# Patient Record
Sex: FEMALE | Race: BLACK OR AFRICAN AMERICAN | NOT HISPANIC OR LATINO | Employment: STUDENT | URBAN - METROPOLITAN AREA
[De-identification: names, ages, dates, MRNs, and addresses within clinical notes are randomized per-mention and may not be internally consistent; named-entity substitution may affect disease eponyms.]

---

## 2025-02-27 PROBLEM — F32.2 MDD (MAJOR DEPRESSIVE DISORDER), SEVERE (MULTI): Status: ACTIVE | Noted: 2022-06-15

## 2025-02-27 PROBLEM — F32.2 MDD (MAJOR DEPRESSIVE DISORDER), SEVERE (MULTI): Status: RESOLVED | Noted: 2022-06-15 | Resolved: 2025-02-27

## 2025-02-27 PROBLEM — M41.9 SCOLIOSIS: Status: ACTIVE | Noted: 2022-06-14

## 2025-02-27 PROBLEM — M41.9 SCOLIOSIS: Status: RESOLVED | Noted: 2022-06-14 | Resolved: 2025-02-27

## 2025-02-28 NOTE — PROGRESS NOTES
Subjective   Laura Alexander is a 19 y.o.  at 29w5d with a working estimated date of delivery of 5/15/2025, by Ultrasound who presents for a initial prenatal visit with a Group New Ob. Patient had a new OB at [facility] and so is a transfer, though she was just getting irregular care there.     1:1 Visit:   Patient doing well overall. In middle of GCT.   Bleeding or cramping since LMP: no  FM: yes     Ultrasound completed this pregnancy: Yes - at [facility]    Taking prenatal vitamin: Yes    Last pap: never had one, amenable with one today     Postpartum Depression: Medium Risk (3/4/2025)    [place]  Depression Scale     Last EPDS Total Score: 7     Last EPDS Self Harm Result: Never        IPV screening:  Have you ever experienced any form of emotional, physical, sexual or financial abuse? no  Have you ever been hit, pushed, bitten, kicked, choked or grabbed by your partner or an ex-partner? no  Do you ever feel scared or threatened by your partner or ex-partner? no    MARIA EUGENIA screening:  Did any of your Parents have problems with alcohol or drug use? No  Do any of your friends (Peers) have problems with alcohol or drug use? No  Does your Partner have a problem with alcohol or drug use? No  Before you were pregnant did you have problems with alcohol or drug use? (Past)  Yes  In the past month, did you drink beer, wine or liquor, or use other drugs? (Pregnancy) No    OB History    Para Term  AB Living   2       1     SAB IAB Ectopic Multiple Live Births     1            # Outcome Date GA Lbr Vahid/2nd Weight Sex Type Anes PTL Lv   2 Current            1 IAB 05/15/22     1st Tri Surg        Past Medical History:   Diagnosis Date    Asthma     hospitalized as a child, nothing as an adult    Hx of chlamydia infection     MDD (major depressive disorder), severe (Multi) 06/15/2022    Poisoning by 4-aminophenol derivatives, intentional self-harm, initial encounter (Multi) 2020    Intentional  acetaminophen poisoning      Past Surgical History:   Procedure Laterality Date    D&C FIRST TRIMESTER / TX INCOMPLETE / MISSED / SEPTIC / INDUCED         Social History     Tobacco Use    Smoking status: Never    Smokeless tobacco: Never   Vaping Use    Vaping status: Never Used   Substance Use Topics    Alcohol use: Not Currently    Drug use: Not Currently     Types: Marijuana        Objective   Physical Exam  Weight: 53.5 kg (118 lb)  Pregravid BMI: 17.58  BP: 115/76    Physical Exam  Constitutional:       Appearance: Normal appearance.   Cardiovascular:      Rate and Rhythm: Normal rate.   Pulmonary:      Effort: Pulmonary effort is normal.   Genitourinary:     General: Normal vulva.      Vagina: Normal.      Cervix: Normal.   Skin:     General: Skin is warm and dry.   Neurological:      Mental Status: She is alert.   Psychiatric:         Mood and Affect: Mood normal.         Behavior: Behavior normal.         Thought Content: Thought content normal.         Judgment: Judgment normal.         Problem List Items Addressed This Visit       29 weeks gestation of pregnancy ([facility]) - Primary    Overview     Desired provider in labor: [] CNM  [] Physician   [] Either Acceptable  [] Blood Products: [] Yes, accepts [] No, needs counseling  [x] Initial BMI: 17.58   [x] Prenatal Labs: WNL for all done at [facility], additional labs done 3/4   [x] Cervical Cancer Screening up to date: 3/4/25  [] Rh status:   [x] Screen for IPV and Substance Use Risk: at new OB 3  [x] Genetic Screening (cfDNA):  MT21 WNL, female   [x] First Trimester Anatomy Screen (11-13.6 wks): WNL  [x] Baby ASA (initiated): not prescribed  [x] Pregnancy dated by: 13 w    [x] Anatomy: WNL at [facility]  [] 1hr GCT at 24-28wks:  [] Rhogam (if indicated):   [] Fetal Surveillance (if indicated):  [] Tdap (27-32 wks, may be given up to 36 wks if initial window missed):   [] RSV (32-36 wks) (Sept. to end of ):     [] Feeding Intentions:  [x] Postpartum Birth  control method: pill/patch   [] GBS at 36 - 37 wks:  [] 39 weeks discussion of IOL vs. Expectant management:  [] Mode of delivery ( anticipated ):            Other Visit Diagnoses       Pregnancy test positive (HHS-HCC)        Relevant Medications    prenatal vitamin, iron-folic, 27 mg iron-800 mcg folic acid tablet    Other Relevant Orders    C. trachomatis / N. gonorrhoeae, Amplified, Urogenital    CBC Anemia Panel With Reflex,Pregnancy    Hemoglobin A1C    Hemoglobin Identification with Path Review    Hepatitis B Core Antibody, Total    Hepatitis B Surface Antibody    Hepatitis B Surface Antigen    Trichomonas vaginalis, Amplified    Type And Screen Is this order related to pregnancy or an upcoming surgery? Yes; Where will this surgery/delivery be performed? [facility]; What is the date of the surgery? 2/27/2025 (no surgery); Has this patient ever had a transfusi...    Urine Culture    Glucose, 1 Hour Screen, Pregnancy    Referral to [facility]    THINPREP PAP    Screening for malignant neoplasm of cervix        Relevant Orders    THINPREP PAP    Encounter for supervision of normal first pregnancy in third trimester                Plan   - Oriented to practice, CNM vs. MD care  - Reviewed IOM recommendations for weight gain given pt's BMI: 28-40 pounds (BMI less than 18.5)  - Reviewed bleeding precautions, warning signs, when to call provider; phone number provided  - The following Rx were sent to pharmacy: PNV  - Routine NOB labs ordered  - Urine culture sent as part of labs for asymptomatic screening only   - pap collected  - Discussed Centering Pregnancy with patient, interested and enrolled  The following educational material was reviewed in the Group New Ob:  Healthy lifestyle choices in pregnancy   Centering vs Individual prenatal care   Reviewed reasons to call: heavy vaginal bleeding, loss of fluid, strong pelvic pain, any questions/concerns  RTC in 4 weeks or prn for next ROBV  *next  visit: Tdap, breastfeeding    1:1 total time 20min  Group Visit total time 120min    TRISTAN Tinoco

## 2025-03-04 ENCOUNTER — INITIAL PRENATAL (OUTPATIENT)
Dept: OBSTETRICS AND GYNECOLOGY | Facility: CLINIC | Age: 20
End: 2025-03-04
Payer: COMMERCIAL

## 2025-03-04 ENCOUNTER — APPOINTMENT (OUTPATIENT)
Dept: LAB | Facility: HOSPITAL | Age: 20
End: 2025-03-04
Payer: COMMERCIAL

## 2025-03-04 ENCOUNTER — PHARMACY VISIT (OUTPATIENT)
Dept: PHARMACY | Facility: CLINIC | Age: 20
End: 2025-03-04
Payer: MEDICAID

## 2025-03-04 VITALS — SYSTOLIC BLOOD PRESSURE: 115 MMHG | WEIGHT: 118 LBS | DIASTOLIC BLOOD PRESSURE: 76 MMHG

## 2025-03-04 DIAGNOSIS — Z3A.29 29 WEEKS GESTATION OF PREGNANCY (HHS-HCC): Primary | ICD-10-CM

## 2025-03-04 DIAGNOSIS — Z12.4 SCREENING FOR MALIGNANT NEOPLASM OF CERVIX: ICD-10-CM

## 2025-03-04 DIAGNOSIS — Z32.01 PREGNANCY TEST POSITIVE (HHS-HCC): ICD-10-CM

## 2025-03-04 DIAGNOSIS — Z34.03 ENCOUNTER FOR SUPERVISION OF NORMAL FIRST PREGNANCY IN THIRD TRIMESTER: ICD-10-CM

## 2025-03-04 LAB
ABO GROUP (TYPE) IN BLOOD: NORMAL
ANTIBODY SCREEN: NORMAL
ERYTHROCYTE [DISTWIDTH] IN BLOOD BY AUTOMATED COUNT: 12.8 % (ref 11.5–14.5)
FERRITIN SERPL-MCNC: 34 NG/ML
FOLATE SERPL-MCNC: >24 NG/ML
HCT VFR BLD AUTO: 31.7 % (ref 36–46)
HGB BLD-MCNC: 10.2 G/DL (ref 12–16)
IRON SATN MFR SERPL: 31 %
IRON SERPL-MCNC: 200 UG/DL
MCH RBC QN AUTO: 28.2 PG (ref 26–34)
MCHC RBC AUTO-ENTMCNC: 32.2 G/DL (ref 32–36)
MCV RBC AUTO: 88 FL (ref 80–100)
NRBC BLD-RTO: 0 /100 WBCS (ref 0–0)
PLATELET # BLD AUTO: 161 X10*3/UL (ref 150–450)
RBC # BLD AUTO: 3.62 X10*6/UL (ref 4–5.2)
REFLEX ADDED, ANEMIA PANEL: NORMAL
RH FACTOR (ANTIGEN D): NORMAL
TIBC SERPL-MCNC: 637 UG/DL
UIBC SERPL-MCNC: 437 UG/DL
VIT B12 SERPL-MCNC: 423 PG/ML
WBC # BLD AUTO: 8.4 X10*3/UL (ref 4.4–11.3)

## 2025-03-04 PROCEDURE — 82607 VITAMIN B-12: CPT

## 2025-03-04 PROCEDURE — 86901 BLOOD TYPING SEROLOGIC RH(D): CPT

## 2025-03-04 PROCEDURE — 85027 COMPLETE CBC AUTOMATED: CPT

## 2025-03-04 PROCEDURE — 86850 RBC ANTIBODY SCREEN: CPT

## 2025-03-04 PROCEDURE — 83550 IRON BINDING TEST: CPT

## 2025-03-04 PROCEDURE — H1000 PRENATAL CARE ATRISK ASSESSM: HCPCS | Performed by: ADVANCED PRACTICE MIDWIFE

## 2025-03-04 PROCEDURE — 83021 HEMOGLOBIN CHROMOTOGRAPHY: CPT

## 2025-03-04 PROCEDURE — 36415 COLL VENOUS BLD VENIPUNCTURE: CPT

## 2025-03-04 PROCEDURE — 86900 BLOOD TYPING SEROLOGIC ABO: CPT

## 2025-03-04 PROCEDURE — 99214 OFFICE O/P EST MOD 30 MIN: CPT | Performed by: ADVANCED PRACTICE MIDWIFE

## 2025-03-04 PROCEDURE — 99204 OFFICE O/P NEW MOD 45 MIN: CPT | Performed by: ADVANCED PRACTICE MIDWIFE

## 2025-03-04 PROCEDURE — 82746 ASSAY OF FOLIC ACID SERUM: CPT

## 2025-03-04 PROCEDURE — 82728 ASSAY OF FERRITIN: CPT

## 2025-03-04 PROCEDURE — RXMED WILLOW AMBULATORY MEDICATION CHARGE

## 2025-03-04 SDOH — ECONOMIC STABILITY: FOOD INSECURITY: WITHIN THE PAST 12 MONTHS, YOU WORRIED THAT YOUR FOOD WOULD RUN OUT BEFORE YOU GOT MONEY TO BUY MORE.: SOMETIMES TRUE

## 2025-03-04 SDOH — ECONOMIC STABILITY: FOOD INSECURITY: WITHIN THE PAST 12 MONTHS, THE FOOD YOU BOUGHT JUST DIDN'T LAST AND YOU DIDN'T HAVE MONEY TO GET MORE.: NEVER TRUE

## 2025-03-04 ASSESSMENT — EDINBURGH POSTNATAL DEPRESSION SCALE (EPDS)
I HAVE BEEN ANXIOUS OR WORRIED FOR NO GOOD REASON: YES, SOMETIMES
I HAVE BEEN SO UNHAPPY THAT I HAVE BEEN CRYING: ONLY OCCASIONALLY
THINGS HAVE BEEN GETTING ON TOP OF ME: NO, MOST OF THE TIME I HAVE COPED QUITE WELL
I HAVE FELT SCARED OR PANICKY FOR NO GOOD REASON: NO, NOT MUCH
I HAVE BLAMED MYSELF UNNECESSARILY WHEN THINGS WENT WRONG: YES, SOME OF THE TIME
I HAVE BEEN ABLE TO LAUGH AND SEE THE FUNNY SIDE OF THINGS: AS MUCH AS I ALWAYS COULD
I HAVE BEEN SO UNHAPPY THAT I HAVE HAD DIFFICULTY SLEEPING: NOT AT ALL
I HAVE LOOKED FORWARD WITH ENJOYMENT TO THINGS: AS MUCH AS I EVER DID
TOTAL SCORE: 7
I HAVE FELT SAD OR MISERABLE: NO, NOT AT ALL
THE THOUGHT OF HARMING MYSELF HAS OCCURRED TO ME: NEVER

## 2025-03-05 DIAGNOSIS — O99.013 ANEMIA AFFECTING PREGNANCY IN THIRD TRIMESTER (HHS-HCC): Primary | ICD-10-CM

## 2025-03-05 LAB
EST. AVERAGE GLUCOSE BLD GHB EST-MCNC: 91 MG/DL
EST. AVERAGE GLUCOSE BLD GHB EST-SCNC: 5 MMOL/L
GLUCOSE 1H P 50 G GLC PO SERPL-MCNC: 80 MG/DL
HBA1C MFR BLD: 4.8 % OF TOTAL HGB
HBV CORE AB SERPL QL IA: NORMAL
HBV SURFACE AB SERPL IA-ACNC: <5 MIU/ML
HBV SURFACE AG SERPL QL IA: NORMAL
HEMOGLOBIN A2: 2.8 % (ref 2–3.5)
HEMOGLOBIN A: 96.8 % (ref 95.8–98)
HEMOGLOBIN F: 0.4 % (ref 0–2)
HEMOGLOBIN IDENTIFICATION INTERPRETATION: NORMAL
PATH REVIEW-HGB IDENTIFICATION: NORMAL

## 2025-03-05 PROCEDURE — RXMED WILLOW AMBULATORY MEDICATION CHARGE

## 2025-03-05 RX ORDER — DOCUSATE SODIUM 100 MG/1
100 CAPSULE, LIQUID FILLED ORAL NIGHTLY PRN
Qty: 30 CAPSULE | Refills: 3 | Status: SHIPPED | OUTPATIENT
Start: 2025-03-05

## 2025-03-05 RX ORDER — QUINIDINE GLUCONATE 324 MG
480 TABLET, EXTENDED RELEASE ORAL EVERY OTHER DAY
Qty: 90 TABLET | Refills: 3 | Status: SHIPPED | OUTPATIENT
Start: 2025-03-05 | End: 2026-03-05

## 2025-03-05 NOTE — PROGRESS NOTES
Subjective   Laura Alexander is a 19 y.o.  at 30w5d with a working estimated date of delivery of 5/15/2025, by Ultrasound who presents for Centering #4. This is the patient's first Centering visit.     1:1 Visit:  She denies vaginal bleeding, leakage of fluid, or strong/consistent contractions. Endorses good fetal movement.     Patient not yet taking PO iron as she hasn't picked it up. She will today.     Declines Tdap today.     Objective   Physical Exam  Weight: 54 kg (119 lb)  Expected Total Weight Gain: 12.5 kg (27 lb)-18 kg (39 lb)   Pregravid BMI: 17.58  BP: 92/61  Fetal Heart Rate: 145 Fundal Height (cm): 30 cm    Problem List Items Addressed This Visit       30 weeks gestation of pregnancy ([facility]) - Primary    Overview     Transfer at 29w  Desired provider in labor: [x] CNM  [] Physician   [] Either Acceptable  [] Blood Products: [] Yes, accepts [] No, needs counseling  [x] Initial BMI: 17.58   [x] Prenatal Labs: WNL for all done at [facility], additional labs done 3/4 WNL except Hep B non immune and anemic   [x] Cervical Cancer Screening up to date: 3/4/25  [x] Rh status: O+  [x] Screen for IPV and Substance Use Risk: at new OB 3  [x] Genetic Screening (cfDNA):  MT21 WNL, female   [x] First Trimester Anatomy Screen (11-13.6 wks): WNL  [x] Baby ASA (initiated): not prescribed  [x] Pregnancy dated by: 13 w    [x] Anatomy: WNL at [facility]  [x] 1hr GCT at 24-28wks: WNL at 29w  [] Fetal Surveillance (if indicated):  [] Tdap (27-32 wks, may be given up to 36 wks if initial window missed):     [] Feeding Intentions:  [x] Postpartum Birth control method: pill/patch   [] GBS at 36 - 37 wks:  [] 39 weeks discussion of IOL vs. Expectant management:  [] Mode of delivery ( anticipated ):           Anemia affecting pregnancy in third trimester ([facility])    Overview     Lab Results   Component Value Date    WBC 8.4 2025    HGB 10.2 (L) 2025    HCT 31.7 (L) 2025    MCV 88 2025      03/04/2025   @29w rx sent for PO iron --> not picked up at 30w, but will            Other Visit Diagnoses       Vaccine counseling        Tetanus, diphtheria, and acellular pertussis (Tdap) vaccination declined        Encounter for supervision of normal first pregnancy in third trimester                Centering Session #4 Plan:   Benefits of breastfeeding discussed with patient, breast pump ordered  Discussed Tdap vaccine, patient declined  -discussed importance of picking up and taking PO iron   -The following educational material was reviewed:  Birth control  Breastfeeding benefits   -Reviewed reasons to call CNM on-call: decreased fetal movement, vaginal bleeding, loss of fluid, increased pelvic pain/contractions, or any questions/concerns  -RTC in 4 weeks for next Centering visit or prn  *next visit: blood products, breast feeding     1:1 total time 10min  Centering Visit total time 120min    ROBBY Tinoco-BENEDICT

## 2025-03-06 ENCOUNTER — TELEPHONE (OUTPATIENT)
Dept: OBSTETRICS AND GYNECOLOGY | Facility: CLINIC | Age: 20
End: 2025-03-06
Payer: COMMERCIAL

## 2025-03-06 PROBLEM — Z23 NEED FOR HEPATITIS B VACCINATION: Status: ACTIVE | Noted: 2025-03-06

## 2025-03-06 LAB
BACTERIA UR CULT: NORMAL
C TRACH RRNA SPEC QL NAA+PROBE: NOT DETECTED
N GONORRHOEA RRNA SPEC QL NAA+PROBE: NOT DETECTED
QUEST GC CT AMPLIFIED (ALWAYS MESSAGE): NORMAL
T VAGINALIS RRNA SPEC QL NAA+PROBE: NOT DETECTED

## 2025-03-06 NOTE — TELEPHONE ENCOUNTER
Pt notified of need for iron, side effects, colace prescribed, taking with oj and not with milk products or at same time as pnv. Discussed high iron foods----- Message from Halley Dietrich sent at 3/5/2025  2:59 PM EST -----  Passed her sugar test   Anemia; PO iron BID every other day and colace nightly prn sent, please call patient about results and common side effects of iron

## 2025-03-11 ENCOUNTER — PHARMACY VISIT (OUTPATIENT)
Dept: PHARMACY | Facility: CLINIC | Age: 20
End: 2025-03-11
Payer: MEDICAID

## 2025-03-11 ENCOUNTER — ROUTINE PRENATAL (OUTPATIENT)
Dept: OBSTETRICS AND GYNECOLOGY | Facility: CLINIC | Age: 20
End: 2025-03-11
Payer: COMMERCIAL

## 2025-03-11 VITALS — DIASTOLIC BLOOD PRESSURE: 61 MMHG | WEIGHT: 119 LBS | SYSTOLIC BLOOD PRESSURE: 92 MMHG

## 2025-03-11 DIAGNOSIS — Z3A.30 30 WEEKS GESTATION OF PREGNANCY (HHS-HCC): Primary | ICD-10-CM

## 2025-03-11 DIAGNOSIS — Z34.03 ENCOUNTER FOR SUPERVISION OF NORMAL FIRST PREGNANCY IN THIRD TRIMESTER: ICD-10-CM

## 2025-03-11 DIAGNOSIS — O99.013 ANEMIA AFFECTING PREGNANCY IN THIRD TRIMESTER (HHS-HCC): ICD-10-CM

## 2025-03-11 DIAGNOSIS — Z28.21 TETANUS, DIPHTHERIA, AND ACELLULAR PERTUSSIS (TDAP) VACCINATION DECLINED: ICD-10-CM

## 2025-03-11 DIAGNOSIS — Z71.85 VACCINE COUNSELING: ICD-10-CM

## 2025-03-11 PROCEDURE — 99213 OFFICE O/P EST LOW 20 MIN: CPT | Mod: TH | Performed by: ADVANCED PRACTICE MIDWIFE

## 2025-03-17 LAB
CYTOLOGY CMNT CVX/VAG CYTO-IMP: NORMAL
LAB AP HPV GENOTYPE QUESTION: YES
LAB AP HPV HR: NORMAL
LABORATORY COMMENT REPORT: NORMAL
MENSTRUAL HX REPORTED: NORMAL
PATH REPORT.TOTAL CANCER: NORMAL

## 2025-04-01 ENCOUNTER — ROUTINE PRENATAL (OUTPATIENT)
Dept: OBSTETRICS AND GYNECOLOGY | Facility: CLINIC | Age: 20
End: 2025-04-01
Payer: COMMERCIAL

## 2025-04-01 VITALS — SYSTOLIC BLOOD PRESSURE: 103 MMHG | WEIGHT: 123 LBS | DIASTOLIC BLOOD PRESSURE: 70 MMHG

## 2025-04-01 DIAGNOSIS — O99.013 ANEMIA AFFECTING PREGNANCY IN THIRD TRIMESTER: ICD-10-CM

## 2025-04-01 DIAGNOSIS — Z28.21 TETANUS, DIPHTHERIA, AND ACELLULAR PERTUSSIS (TDAP) VACCINATION DECLINED: ICD-10-CM

## 2025-04-01 DIAGNOSIS — Z34.03 SUPERVISION OF NORMAL FIRST PREGNANCY IN THIRD TRIMESTER: ICD-10-CM

## 2025-04-01 DIAGNOSIS — Z3A.33 33 WEEKS GESTATION OF PREGNANCY (HHS-HCC): Primary | ICD-10-CM

## 2025-04-01 DIAGNOSIS — Z71.85 VACCINE COUNSELING: ICD-10-CM

## 2025-04-01 PROCEDURE — 99213 OFFICE O/P EST LOW 20 MIN: CPT | Mod: TH | Performed by: ADVANCED PRACTICE MIDWIFE

## 2025-04-01 PROCEDURE — H1002 CARECOORDINATION PRENATAL: HCPCS | Performed by: ADVANCED PRACTICE MIDWIFE

## 2025-04-01 PROCEDURE — S9436 LAMAZE CLASS: HCPCS | Performed by: ADVANCED PRACTICE MIDWIFE

## 2025-04-01 PROCEDURE — 99078 GROUP HEALTH EDUCATION: CPT | Performed by: ADVANCED PRACTICE MIDWIFE

## 2025-04-01 PROCEDURE — 99213 OFFICE O/P EST LOW 20 MIN: CPT | Performed by: ADVANCED PRACTICE MIDWIFE

## 2025-04-01 NOTE — PROGRESS NOTES
Subjective   Laura Alexander is a 19 y.o.  at 33w5d with a working estimated date of delivery of 5/15/2025, by Ultrasound who presents for Centering #5.     1:1 Visit:   She denies vaginal bleeding, leakage of fluid, or strong/consistent contractions. Endorses good fetal movement.     Patient declines Tdap today.     Objective   Physical Exam:   Weight: 55.8 kg (123 lb)  Expected Total Weight Gain: 12.5 kg (27 lb)-18 kg (39 lb)   Pregravid BMI: 17.58  BP: 103/70  Fetal Heart Rate: 150 Fundal Height (cm): 34 cm Presentation: Breech           Problem List Items Addressed This Visit       33 weeks gestation of pregnancy ([facility]) - Primary    Overview     Transfer at 29w  Desired provider in labor: [x] CNM  [] Physician   [] Either Acceptable  [x] Blood Products: [x] Yes, accepts [] No, needs counseling  [x] Initial BMI: 17.58   [x] Prenatal Labs: WNL for all done at [facility], additional labs done 3/4 WNL except Hep B non immune and anemic   [x] Cervical Cancer Screening up to date: 3/4/25 WNL  [x] Rh status: O+  [x] Screen for IPV and Substance Use Risk: at new OB 3/4  [x] Genetic Screening (cfDNA):  MT21 WNL, female   [x] First Trimester Anatomy Screen (11-13.6 wks): WNL  [x] Baby ASA (initiated): not prescribed  [x] Pregnancy dated by: 13 w    [x] Anatomy: WNL at F  [x] 1hr GCT at 24-28wks: WNL at 29w  [] Fetal Surveillance (if indicated):  [x] Tdap (27-32 wks, may be given up to 36 wks if initial window missed): declined 3/11 and     [x] Feeding Intentions: breast and bottle  [x] Postpartum Birth control method: pill/patch   [] GBS at 36 - 37 wks:  [] 39 weeks discussion of IOL vs. Expectant management:  [] Mode of delivery ( anticipated ):           Anemia affecting pregnancy in third trimester    Overview     Lab Results   Component Value Date    WBC 8.4 2025    HGB 10.2 (L) 2025    HCT 31.7 (L) 2025    MCV 88 2025     2025   @29w rx sent for PO iron --> not  picked up at 30w, but will           Need for hepatitis B vaccination    Overview     Non immune          Other Visit Diagnoses       Vaccine counseling        Tetanus, diphtheria, and acellular pertussis (Tdap) vaccination declined                Centering Sessions #5 Plan:  -The following educational material was reviewed:  Comfort measures and relaxation options during labor  Stages of labor  Pain management options in labor  Movement in labor  -Reviewed s/sx of PTL, warning signs, fetal movement counts, and when to call provider  -Return to clinic in 2 weeks for next Centering visit or prn   *next visit: CBC, pump    1:1 total time 10min  Centering Visit total time 120min    ROBBY Tinoco-BENEDICT

## 2025-04-07 NOTE — PROGRESS NOTES
Subjective   Laura Alexander is a 19 y.o.  at 34w5d with a working estimated date of delivery of 5/15/2025, by Ultrasound who presents for Centering #6.     1:1 Visit:   She denies vaginal bleeding, leakage of fluid, or strong/consistent contractions. Endorses good fetal movement.     Patient still taking her PO iron. Not forgetting frequently.     Objective   Physical Exam:   Weight: 56.6 kg (124 lb 11.2 oz)  Expected Total Weight Gain: 12.5 kg (27 lb)-18 kg (39 lb)   Pregravid BMI: 17.58  BP: 100/69  Fetal Heart Rate: 155 Fundal Height (cm): 32 cm Presentation: Vertex           Problem List Items Addressed This Visit       33 weeks gestation of pregnancy ([facility]) - Primary    Overview     Transfer at 29w  Desired provider in labor: [x] CNM  [] Physician   [] Either Acceptable  [x] Blood Products: [x] Yes, accepts [] No, needs counseling  [x] Initial BMI: 17.58   [x] Prenatal Labs: WNL for all done at [facility], additional labs done 3 WNL except Hep B non immune and anemic   [x] Cervical Cancer Screening up to date: 3/4/25 WNL  [x] Rh status: O+  [x] Screen for IPV and Substance Use Risk: at new OB 3  [x] Genetic Screening (cfDNA):  MT21 WNL, female   [x] First Trimester Anatomy Screen (11-13.6 wks): WNL  [x] Baby ASA (initiated): not prescribed  [x] Pregnancy dated by: 13 w    [x] Anatomy: WNL at [facility]  [x] 1hr GCT at 24-28wks: WNL at 29w  [] Fetal Surveillance (if indicated):  [x] Tdap (27-32 wks, may be given up to 36 wks if initial window missed): declined 3/11 and     [x] Feeding Intentions: breast and bottle  [x] Postpartum Birth control method: pill/patch   [] GBS at 36 - 37 wks:  [] 39 weeks discussion of IOL vs. Expectant management:  [] Mode of delivery ( anticipated ):           Anemia affecting pregnancy in third trimester    Overview     Lab Results   Component Value Date    WBC 8.4 2025    HGB 10.2 (L) 2025    HCT 31.7 (L) 2025    MCV 88 2025      03/04/2025   @29w rx sent for PO iron --> not picked up at 30w, but will           Need for hepatitis B vaccination    Overview     Non immune          Other Visit Diagnoses       Encounter for supervision of normal first pregnancy in third trimester              Centering Sessions #6 Plan:  -The following educational material was reviewed:  Labor   Pain management options in labor  Induction options/ methods   Immediate postpartum care   -Reviewed s/sx of PTL, warning signs, fetal movement counts, and when to call provider  -Return to clinic in 2 weeks for next Centering visit or prn   *next visit: CBC, GBS     1:1 total time 10min  Centering Visit total time 120min    ROBBY Tinoco-BNEEDICT

## 2025-04-08 ENCOUNTER — ROUTINE PRENATAL (OUTPATIENT)
Dept: OBSTETRICS AND GYNECOLOGY | Facility: CLINIC | Age: 20
End: 2025-04-08
Payer: COMMERCIAL

## 2025-04-08 VITALS — WEIGHT: 124.7 LBS | DIASTOLIC BLOOD PRESSURE: 69 MMHG | SYSTOLIC BLOOD PRESSURE: 100 MMHG

## 2025-04-08 DIAGNOSIS — Z3A.34 34 WEEKS GESTATION OF PREGNANCY (HHS-HCC): Primary | ICD-10-CM

## 2025-04-08 DIAGNOSIS — O99.013 ANEMIA AFFECTING PREGNANCY IN THIRD TRIMESTER: ICD-10-CM

## 2025-04-08 DIAGNOSIS — Z34.03 ENCOUNTER FOR SUPERVISION OF NORMAL FIRST PREGNANCY IN THIRD TRIMESTER: ICD-10-CM

## 2025-04-08 DIAGNOSIS — Z23 NEED FOR HEPATITIS B VACCINATION: ICD-10-CM

## 2025-04-08 PROCEDURE — 99213 OFFICE O/P EST LOW 20 MIN: CPT | Mod: TH | Performed by: ADVANCED PRACTICE MIDWIFE

## 2025-04-08 PROCEDURE — S9436 LAMAZE CLASS: HCPCS | Performed by: ADVANCED PRACTICE MIDWIFE

## 2025-04-08 PROCEDURE — 99213 OFFICE O/P EST LOW 20 MIN: CPT | Performed by: ADVANCED PRACTICE MIDWIFE

## 2025-04-08 PROCEDURE — 99078 GROUP HEALTH EDUCATION: CPT | Performed by: ADVANCED PRACTICE MIDWIFE

## 2025-04-27 PROCEDURE — RXMED WILLOW AMBULATORY MEDICATION CHARGE

## 2025-05-05 ENCOUNTER — HOSPITAL ENCOUNTER (OUTPATIENT)
Facility: HOSPITAL | Age: 20
Discharge: HOME | End: 2025-05-05
Attending: STUDENT IN AN ORGANIZED HEALTH CARE EDUCATION/TRAINING PROGRAM | Admitting: STUDENT IN AN ORGANIZED HEALTH CARE EDUCATION/TRAINING PROGRAM
Payer: COMMERCIAL

## 2025-05-05 VITALS
DIASTOLIC BLOOD PRESSURE: 75 MMHG | HEIGHT: 63 IN | WEIGHT: 132.06 LBS | RESPIRATION RATE: 16 BRPM | SYSTOLIC BLOOD PRESSURE: 118 MMHG | HEART RATE: 86 BPM | OXYGEN SATURATION: 99 % | BODY MASS INDEX: 23.4 KG/M2 | TEMPERATURE: 97.5 F

## 2025-05-05 LAB
POC APPEARANCE, URINE: CLEAR
POC BILIRUBIN, URINE: NEGATIVE
POC BLOOD, URINE: NEGATIVE
POC COLOR, URINE: YELLOW
POC GLUCOSE, URINE: NEGATIVE MG/DL
POC KETONES, URINE: NEGATIVE MG/DL
POC LEUKOCYTES, URINE: NEGATIVE
POC NITRITE,URINE: NEGATIVE
POC PH, URINE: 7 PH
POC PROTEIN, URINE: NEGATIVE MG/DL
POC SPECIFIC GRAVITY, URINE: 1.01
POC UROBILINOGEN, URINE: 0.2 EU/DL

## 2025-05-05 PROCEDURE — 99214 OFFICE O/P EST MOD 30 MIN: CPT | Mod: 25

## 2025-05-05 PROCEDURE — 87081 CULTURE SCREEN ONLY: CPT

## 2025-05-05 PROCEDURE — 59025 FETAL NON-STRESS TEST: CPT

## 2025-05-05 SDOH — HEALTH STABILITY: MENTAL HEALTH: WERE YOU ABLE TO COMPLETE ALL THE BEHAVIORAL HEALTH SCREENINGS?: YES

## 2025-05-05 SDOH — SOCIAL STABILITY: SOCIAL INSECURITY: DOES ANYONE TRY TO KEEP YOU FROM HAVING/CONTACTING OTHER FRIENDS OR DOING THINGS OUTSIDE YOUR HOME?: NO

## 2025-05-05 SDOH — HEALTH STABILITY: MENTAL HEALTH: WISH TO BE DEAD (PAST 1 MONTH): NO

## 2025-05-05 SDOH — SOCIAL STABILITY: SOCIAL INSECURITY: HAS ANYONE EVER THREATENED TO HURT YOUR FAMILY OR YOUR PETS?: NO

## 2025-05-05 SDOH — HEALTH STABILITY: MENTAL HEALTH: HAVE YOU USED ANY SUBSTANCES (CANABIS, COCAINE, HEROIN, HALLUCINOGENS, INHALANTS, ETC.) IN THE PAST 12 MONTHS?: NO

## 2025-05-05 SDOH — HEALTH STABILITY: MENTAL HEALTH: SUICIDAL BEHAVIOR (LIFETIME): NO

## 2025-05-05 SDOH — HEALTH STABILITY: MENTAL HEALTH: HAVE YOU USED ANY PRESCRIPTION DRUGS OTHER THAN PRESCRIBED IN THE PAST 12 MONTHS?: NO

## 2025-05-05 SDOH — SOCIAL STABILITY: SOCIAL INSECURITY: DO YOU FEEL ANYONE HAS EXPLOITED OR TAKEN ADVANTAGE OF YOU FINANCIALLY OR OF YOUR PERSONAL PROPERTY?: NO

## 2025-05-05 SDOH — SOCIAL STABILITY: SOCIAL INSECURITY: ARE THERE ANY APPARENT SIGNS OF INJURIES/BEHAVIORS THAT COULD BE RELATED TO ABUSE/NEGLECT?: NO

## 2025-05-05 SDOH — ECONOMIC STABILITY: HOUSING INSECURITY: DO YOU FEEL UNSAFE GOING BACK TO THE PLACE WHERE YOU ARE LIVING?: NO

## 2025-05-05 SDOH — SOCIAL STABILITY: SOCIAL INSECURITY: VERBAL ABUSE: DENIES

## 2025-05-05 SDOH — SOCIAL STABILITY: SOCIAL INSECURITY: HAVE YOU HAD ANY THOUGHTS OF HARMING ANYONE ELSE?: NO

## 2025-05-05 SDOH — HEALTH STABILITY: MENTAL HEALTH: NON-SPECIFIC ACTIVE SUICIDAL THOUGHTS (PAST 1 MONTH): NO

## 2025-05-05 SDOH — SOCIAL STABILITY: SOCIAL INSECURITY: ABUSE SCREEN: ADULT

## 2025-05-05 SDOH — SOCIAL STABILITY: SOCIAL INSECURITY: HAVE YOU HAD THOUGHTS OF HARMING ANYONE ELSE?: NO

## 2025-05-05 SDOH — SOCIAL STABILITY: SOCIAL INSECURITY: PHYSICAL ABUSE: DENIES

## 2025-05-05 SDOH — SOCIAL STABILITY: SOCIAL INSECURITY: ARE YOU OR HAVE YOU BEEN THREATENED OR ABUSED PHYSICALLY, EMOTIONALLY, OR SEXUALLY BY ANYONE?: NO

## 2025-05-05 ASSESSMENT — PATIENT HEALTH QUESTIONNAIRE - PHQ9
2. FEELING DOWN, DEPRESSED OR HOPELESS: NOT AT ALL
1. LITTLE INTEREST OR PLEASURE IN DOING THINGS: NOT AT ALL
SUM OF ALL RESPONSES TO PHQ9 QUESTIONS 1 & 2: 0

## 2025-05-05 ASSESSMENT — LIFESTYLE VARIABLES
AUDIT-C TOTAL SCORE: 0
HOW OFTEN DO YOU HAVE 6 OR MORE DRINKS ON ONE OCCASION: NEVER
HOW OFTEN DO YOU HAVE A DRINK CONTAINING ALCOHOL: NEVER
SKIP TO QUESTIONS 9-10: 1
AUDIT-C TOTAL SCORE: 0
HOW MANY STANDARD DRINKS CONTAINING ALCOHOL DO YOU HAVE ON A TYPICAL DAY: PATIENT DOES NOT DRINK

## 2025-05-05 ASSESSMENT — PAIN SCALES - GENERAL
PAINLEVEL_OUTOF10: 0 - NO PAIN

## 2025-05-05 NOTE — PROGRESS NOTES
"Have you ever experienced any form of emotional, physical, sexual or financial abuse? {yes***/no:06403::\"no\"}  Have you ever been hit, pushed, bitten, kicked, choked or grabbed by your partner or an ex-partner? {yes***/no:93963::\"no\"}  Do you ever feel scared or threatened by your partner or ex-partner? {yes***/no:04279::\"no\"}    GBS  IOL  "

## 2025-05-05 NOTE — H&P
OB Triage H&P    ASSESSMENT & PLAN: Laura Alexander is a 19 y.o.  at 38w4d who presents to triage with leaking of fluids    R/o PROM  - SSE: Pooling/nitrazine/ferning negative x3  - SVE: /-2  - [place]: occasional contractions, mild per patient  - Udip WNL  - No evidence of rupture of membranes on exam, low suspicion for labor    IUP at 38w4d   -Fetal monitoring reassuring  -Good fetal movement  -Lapse in prenatal care since 34w5d  -Has appt tomorrow, encouraged to keept appt  -GBS unknown, collected today    Dispo:  -Patient appropriate for discharge home, agrees with plan  -Return precautions discussed   -Follow up at next scheduled OB appointment or to triage sooner as needed    Melissa L Zahorsky, APRN-CNM    Subjective   Laura presents reporting leaking of fluid that began last night. She states she has been having to urinate often and has been leaking small amount of fluid on and off. Is not leaking through her underwear and she has not had to wear a pad. Unsure of color. Reports good fetal movement. Denies vaginal bleeding. C/O of occasional mild cramping in her lower back. Thinks she lost her mucous plug today. Denies dysuria, vaginal irritation/odor.     Prenatal Provider BENEDICT Dietrich    Pregnancy Problems (from 25 to present)       Problem Noted Diagnosed Resolved    Need for hepatitis B vaccination 3/6/2025 by TRISTAN Tinoco  No    Priority:  Medium       Overview Signed 3/6/2025  7:55 AM by TRISTAN Tinoco   Non immune         Anemia affecting pregnancy in third trimester 3/5/2025 by TRISTAN Tinoco  No    Priority:  Medium       Overview Addendum 3/11/2025 11:50 AM by TRISTAN Tinoco   Lab Results   Component Value Date    WBC 8.4 2025    HGB 10.2 (L) 2025    HCT 31.7 (L) 2025    MCV 88 2025     2025   @29w rx sent for PO iron --> not picked up at 30w, but will            33 weeks gestation of pregnancy ([facility]) 3/4/2025 by TRISTAN Tinoco  No    Priority:  Medium       Overview Addendum 2025  1:46 PM by TRISTAN Tinoco   Transfer at 29w  Desired provider in labor: [x] CNM  [] Physician   [] Either Acceptable  [x] Blood Products: [x] Yes, accepts [] No, needs counseling  [x] Initial BMI: 17.58   [x] Prenatal Labs: WNL for all done at [facility], additional labs done 3/4 WNL except Hep B non immune and anemic   [x] Cervical Cancer Screening up to date: 3/4/25 WNL  [x] Rh status: O+  [x] Screen for IPV and Substance Use Risk: at new OB 3/4  [x] Genetic Screening (cfDNA):  MT21 WNL, female   [x] First Trimester Anatomy Screen (11-13.6 wks): WNL  [x] Baby ASA (initiated): not prescribed  [x] Pregnancy dated by: 13 w    [x] Anatomy: WNL at [facility]  [x] 1hr GCT at 24-28wks: WNL at 29w  [] Fetal Surveillance (if indicated):  [x] Tdap (27-32 wks, may be given up to 36 wks if initial window missed): declined 3/11 and     [x] Feeding Intentions: breast and bottle  [x] Postpartum Birth control method: pill/patch   [] GBS at 36 - 37 wks:  [] 39 weeks discussion of IOL vs. Expectant management:  [] Mode of delivery ( anticipated ):                   OB History    Para Term  AB Living   2 0 0 0 1 0   SAB IAB Ectopic Multiple Live Births   0 1 0 0 0      # Outcome Date GA Lbr Vahid/2nd Weight Sex Type Anes PTL Lv   2 Current            1 IAB 05/15/22     1st Tri Surg          Surgical History[1]    Social History     Tobacco Use    Smoking status: Never    Smokeless tobacco: Never   Substance Use Topics    Alcohol use: Not Currently       Allergies[2]    Prescriptions Prior to Admission[3]  Objective     Last Vitals  Temp Pulse Resp BP MAP O2 Sat   36.4 °C (97.5 °F) 86 16 118/75 90 99 %         Physical Exam  General: NAD, mood appropriate  Cardiopulmonary: warm and well perfused, breathing comfortably on room air  Abdomen: Gravid,  non-tender  Extremities: Symmetric  Speculum Exam: no pooling of fluid seen, Nitrazine test is negative, Ferning test is negative, small amount of white thin vaginal discharge  Cervix: 2/50/-2/soft/anterior     Fetal Monitoring  Baseline: 130 bpm, Variability: Moderate, Accelerations: Present and Decelerations: None  Uterine Activity: Irregular contractions, q8-10 mins  Interpretation: Category 1    Bedside Ultrasound: NO    Labs in chart were reviewed.        Prenatal labs reviewed, remarkable for anemia, hepatitis B nonimmune          [1]   Past Surgical History:  Procedure Laterality Date    D&C FIRST TRIMESTER / TX INCOMPLETE / MISSED / SEPTIC / INDUCED      [2] No Known Allergies  [3]   No medications prior to admission.

## 2025-05-06 ENCOUNTER — APPOINTMENT (OUTPATIENT)
Dept: OBSTETRICS AND GYNECOLOGY | Facility: CLINIC | Age: 20
End: 2025-05-06
Payer: COMMERCIAL

## 2025-05-06 ENCOUNTER — ANESTHESIA (OUTPATIENT)
Dept: OBSTETRICS AND GYNECOLOGY | Facility: HOSPITAL | Age: 20
End: 2025-05-06
Payer: COMMERCIAL

## 2025-05-06 ENCOUNTER — ANESTHESIA EVENT (OUTPATIENT)
Dept: OBSTETRICS AND GYNECOLOGY | Facility: HOSPITAL | Age: 20
End: 2025-05-06
Payer: COMMERCIAL

## 2025-05-06 ENCOUNTER — HOSPITAL ENCOUNTER (INPATIENT)
Facility: HOSPITAL | Age: 20
LOS: 3 days | Discharge: HOME | End: 2025-05-09
Attending: STUDENT IN AN ORGANIZED HEALTH CARE EDUCATION/TRAINING PROGRAM | Admitting: NURSE PRACTITIONER
Payer: COMMERCIAL

## 2025-05-06 ENCOUNTER — TELEPHONE (OUTPATIENT)
Dept: OBSTETRICS AND GYNECOLOGY | Facility: CLINIC | Age: 20
End: 2025-05-06
Payer: COMMERCIAL

## 2025-05-06 PROBLEM — J45.909 ASTHMA: Status: ACTIVE | Noted: 2025-05-06

## 2025-05-06 PROBLEM — Z3A.38 38 WEEKS GESTATION OF PREGNANCY (HHS-HCC): Status: ACTIVE | Noted: 2025-03-04

## 2025-05-06 PROBLEM — O41.00X0: Status: ACTIVE | Noted: 2025-05-06

## 2025-05-06 PROBLEM — O42.90: Status: ACTIVE | Noted: 2025-05-06

## 2025-05-06 PROBLEM — F32.A DEPRESSION: Status: ACTIVE | Noted: 2025-05-06

## 2025-05-06 LAB
ABO GROUP (TYPE) IN BLOOD: NORMAL
ANTIBODY SCREEN: NORMAL
ERYTHROCYTE [DISTWIDTH] IN BLOOD BY AUTOMATED COUNT: 14.1 % (ref 11.5–14.5)
ERYTHROCYTE [DISTWIDTH] IN BLOOD BY AUTOMATED COUNT: 14.3 % (ref 11.5–14.5)
HCT VFR BLD AUTO: 35.2 % (ref 36–46)
HCT VFR BLD AUTO: 37.2 % (ref 36–46)
HGB BLD-MCNC: 11.3 G/DL (ref 12–16)
HGB BLD-MCNC: 11.9 G/DL (ref 12–16)
HOLD SPECIMEN: NORMAL
MCH RBC QN AUTO: 27.4 PG (ref 26–34)
MCH RBC QN AUTO: 27.7 PG (ref 26–34)
MCHC RBC AUTO-ENTMCNC: 32 G/DL (ref 32–36)
MCHC RBC AUTO-ENTMCNC: 32.1 G/DL (ref 32–36)
MCV RBC AUTO: 85 FL (ref 80–100)
MCV RBC AUTO: 87 FL (ref 80–100)
NRBC BLD-RTO: 0 /100 WBCS (ref 0–0)
NRBC BLD-RTO: 0 /100 WBCS (ref 0–0)
PLATELET # BLD AUTO: 133 X10*3/UL (ref 150–450)
PLATELET # BLD AUTO: ABNORMAL 10*3/UL
RBC # BLD AUTO: 4.12 X10*6/UL (ref 4–5.2)
RBC # BLD AUTO: 4.3 X10*6/UL (ref 4–5.2)
RH FACTOR (ANTIGEN D): NORMAL
TREPONEMA PALLIDUM IGG+IGM AB [PRESENCE] IN SERUM OR PLASMA BY IMMUNOASSAY: NONREACTIVE
WBC # BLD AUTO: 8.5 X10*3/UL (ref 4.4–11.3)
WBC # BLD AUTO: 8.7 X10*3/UL (ref 4.4–11.3)

## 2025-05-06 PROCEDURE — 86901 BLOOD TYPING SEROLOGIC RH(D): CPT

## 2025-05-06 PROCEDURE — 99214 OFFICE O/P EST MOD 30 MIN: CPT

## 2025-05-06 PROCEDURE — 36415 COLL VENOUS BLD VENIPUNCTURE: CPT | Performed by: NURSE PRACTITIONER

## 2025-05-06 PROCEDURE — 7210000002 HC LABOR PER HOUR

## 2025-05-06 PROCEDURE — 85027 COMPLETE CBC AUTOMATED: CPT | Performed by: NURSE PRACTITIONER

## 2025-05-06 PROCEDURE — 2500000004 HC RX 250 GENERAL PHARMACY W/ HCPCS (ALT 636 FOR OP/ED): Mod: SE,JZ

## 2025-05-06 PROCEDURE — 2500000004 HC RX 250 GENERAL PHARMACY W/ HCPCS (ALT 636 FOR OP/ED): Mod: SE,JZ | Performed by: NURSE PRACTITIONER

## 2025-05-06 PROCEDURE — 85027 COMPLETE CBC AUTOMATED: CPT

## 2025-05-06 PROCEDURE — 86780 TREPONEMA PALLIDUM: CPT

## 2025-05-06 PROCEDURE — 36415 COLL VENOUS BLD VENIPUNCTURE: CPT

## 2025-05-06 PROCEDURE — 1120000001 HC OB PRIVATE ROOM DAILY

## 2025-05-06 RX ORDER — OXYTOCIN/0.9 % SODIUM CHLORIDE 30/500 ML
60 PLASTIC BAG, INJECTION (ML) INTRAVENOUS ONCE AS NEEDED
Status: DISCONTINUED | OUTPATIENT
Start: 2025-05-06 | End: 2025-05-07

## 2025-05-06 RX ORDER — LABETALOL HYDROCHLORIDE 5 MG/ML
INJECTION, SOLUTION INTRAVENOUS
Status: DISPENSED
Start: 2025-05-06 | End: 2025-05-07

## 2025-05-06 RX ORDER — TERBUTALINE SULFATE 1 MG/ML
0.25 INJECTION SUBCUTANEOUS ONCE AS NEEDED
Status: DISCONTINUED | OUTPATIENT
Start: 2025-05-06 | End: 2025-05-07

## 2025-05-06 RX ORDER — SODIUM CHLORIDE, SODIUM LACTATE, POTASSIUM CHLORIDE, CALCIUM CHLORIDE 600; 310; 30; 20 MG/100ML; MG/100ML; MG/100ML; MG/100ML
75 INJECTION, SOLUTION INTRAVENOUS CONTINUOUS
Status: ACTIVE | OUTPATIENT
Start: 2025-05-06 | End: 2025-05-07

## 2025-05-06 RX ORDER — ACETAMINOPHEN 325 MG/1
975 TABLET ORAL EVERY 6 HOURS PRN
Status: DISCONTINUED | OUTPATIENT
Start: 2025-05-06 | End: 2025-05-07

## 2025-05-06 RX ORDER — OXYTOCIN/0.9 % SODIUM CHLORIDE 30/500 ML
60 PLASTIC BAG, INJECTION (ML) INTRAVENOUS ONCE AS NEEDED
Status: COMPLETED | OUTPATIENT
Start: 2025-05-06 | End: 2025-05-07

## 2025-05-06 RX ORDER — HYDRALAZINE HYDROCHLORIDE 20 MG/ML
5 INJECTION INTRAMUSCULAR; INTRAVENOUS ONCE AS NEEDED
Status: DISCONTINUED | OUTPATIENT
Start: 2025-05-06 | End: 2025-05-07

## 2025-05-06 RX ORDER — ONDANSETRON HYDROCHLORIDE 2 MG/ML
4 INJECTION, SOLUTION INTRAVENOUS EVERY 6 HOURS PRN
Status: DISCONTINUED | OUTPATIENT
Start: 2025-05-06 | End: 2025-05-09 | Stop reason: HOSPADM

## 2025-05-06 RX ORDER — TRANEXAMIC ACID 1 G/10ML
1000 INJECTION, SOLUTION INTRAVENOUS ONCE AS NEEDED
Status: DISCONTINUED | OUTPATIENT
Start: 2025-05-06 | End: 2025-05-07

## 2025-05-06 RX ORDER — LABETALOL HYDROCHLORIDE 5 MG/ML
20 INJECTION, SOLUTION INTRAVENOUS ONCE AS NEEDED
Status: DISCONTINUED | OUTPATIENT
Start: 2025-05-06 | End: 2025-05-06

## 2025-05-06 RX ORDER — OXYTOCIN 10 [USP'U]/ML
10 INJECTION, SOLUTION INTRAMUSCULAR; INTRAVENOUS ONCE AS NEEDED
Status: DISCONTINUED | OUTPATIENT
Start: 2025-05-06 | End: 2025-05-07

## 2025-05-06 RX ORDER — LIDOCAINE HYDROCHLORIDE 10 MG/ML
30 INJECTION, SOLUTION INFILTRATION; PERINEURAL ONCE AS NEEDED
Status: DISCONTINUED | OUTPATIENT
Start: 2025-05-06 | End: 2025-05-07

## 2025-05-06 RX ORDER — ONDANSETRON 4 MG/1
4 TABLET, FILM COATED ORAL EVERY 6 HOURS PRN
Status: DISCONTINUED | OUTPATIENT
Start: 2025-05-06 | End: 2025-05-06

## 2025-05-06 RX ORDER — OXYTOCIN/0.9 % SODIUM CHLORIDE 30/500 ML
2-30 PLASTIC BAG, INJECTION (ML) INTRAVENOUS CONTINUOUS
Status: DISCONTINUED | OUTPATIENT
Start: 2025-05-06 | End: 2025-05-09 | Stop reason: HOSPADM

## 2025-05-06 RX ORDER — DOCUSATE SODIUM 100 MG/1
100 CAPSULE, LIQUID FILLED ORAL NIGHTLY PRN
Status: DISCONTINUED | OUTPATIENT
Start: 2025-05-06 | End: 2025-05-09 | Stop reason: HOSPADM

## 2025-05-06 RX ORDER — ONDANSETRON HYDROCHLORIDE 2 MG/ML
4 INJECTION, SOLUTION INTRAVENOUS EVERY 6 HOURS PRN
Status: DISCONTINUED | OUTPATIENT
Start: 2025-05-06 | End: 2025-05-06

## 2025-05-06 RX ORDER — LIDOCAINE HYDROCHLORIDE 10 MG/ML
0.5 INJECTION, SOLUTION INFILTRATION; PERINEURAL ONCE AS NEEDED
Status: DISCONTINUED | OUTPATIENT
Start: 2025-05-06 | End: 2025-05-06

## 2025-05-06 RX ORDER — MISOPROSTOL 200 UG/1
800 TABLET ORAL ONCE AS NEEDED
Status: DISCONTINUED | OUTPATIENT
Start: 2025-05-06 | End: 2025-05-07

## 2025-05-06 RX ORDER — NALBUPHINE HYDROCHLORIDE 10 MG/ML
10 INJECTION INTRAMUSCULAR; INTRAVENOUS; SUBCUTANEOUS ONCE
Status: COMPLETED | OUTPATIENT
Start: 2025-05-07 | End: 2025-05-07

## 2025-05-06 RX ORDER — FENTANYL/ROPIVACAINE/NS/PF 2MCG/ML-.2
0-25 PLASTIC BAG, INJECTION (ML) INJECTION CONTINUOUS
Refills: 0 | Status: DISCONTINUED | OUTPATIENT
Start: 2025-05-07 | End: 2025-05-09 | Stop reason: HOSPADM

## 2025-05-06 RX ORDER — HYDRALAZINE HYDROCHLORIDE 20 MG/ML
5 INJECTION INTRAMUSCULAR; INTRAVENOUS ONCE AS NEEDED
Status: DISCONTINUED | OUTPATIENT
Start: 2025-05-06 | End: 2025-05-06

## 2025-05-06 RX ORDER — PENICILLIN G 3000000 [IU]/50ML
3 INJECTION, SOLUTION INTRAVENOUS EVERY 4 HOURS
Status: DISCONTINUED | OUTPATIENT
Start: 2025-05-07 | End: 2025-05-07

## 2025-05-06 RX ORDER — METHYLERGONOVINE MALEATE 0.2 MG/ML
0.2 INJECTION INTRAVENOUS ONCE AS NEEDED
Status: DISCONTINUED | OUTPATIENT
Start: 2025-05-06 | End: 2025-05-07

## 2025-05-06 RX ORDER — FERROUS GLUCONATE 325 MG
480 TABLET ORAL EVERY OTHER DAY
Status: DISCONTINUED | OUTPATIENT
Start: 2025-05-07 | End: 2025-05-09 | Stop reason: HOSPADM

## 2025-05-06 RX ORDER — LOPERAMIDE HYDROCHLORIDE 2 MG/1
4 CAPSULE ORAL EVERY 2 HOUR PRN
Status: DISCONTINUED | OUTPATIENT
Start: 2025-05-06 | End: 2025-05-07

## 2025-05-06 RX ORDER — CARBOPROST TROMETHAMINE 250 UG/ML
250 INJECTION, SOLUTION INTRAMUSCULAR ONCE AS NEEDED
Status: DISCONTINUED | OUTPATIENT
Start: 2025-05-06 | End: 2025-05-07

## 2025-05-06 RX ORDER — ONDANSETRON 4 MG/1
4 TABLET, FILM COATED ORAL EVERY 6 HOURS PRN
Status: DISCONTINUED | OUTPATIENT
Start: 2025-05-06 | End: 2025-05-09 | Stop reason: HOSPADM

## 2025-05-06 RX ORDER — LABETALOL HYDROCHLORIDE 5 MG/ML
20 INJECTION, SOLUTION INTRAVENOUS ONCE AS NEEDED
Status: DISCONTINUED | OUTPATIENT
Start: 2025-05-06 | End: 2025-05-07

## 2025-05-06 RX ADMIN — SODIUM CHLORIDE, SODIUM LACTATE, POTASSIUM CHLORIDE, AND CALCIUM CHLORIDE 75 ML/HR: .6; .31; .03; .02 INJECTION, SOLUTION INTRAVENOUS at 14:13

## 2025-05-06 RX ADMIN — Medication 2 MILLI-UNITS/MIN: at 19:09

## 2025-05-06 RX ADMIN — PENICILLIN G POTASSIUM 5 MILLION UNITS: 5000000 INJECTION, POWDER, FOR SOLUTION INTRAMUSCULAR; INTRAVENOUS at 21:27

## 2025-05-06 SDOH — SOCIAL STABILITY: SOCIAL INSECURITY: ABUSE SCREEN: ADULT

## 2025-05-06 SDOH — SOCIAL STABILITY: SOCIAL INSECURITY: WITHIN THE LAST YEAR, HAVE YOU BEEN AFRAID OF YOUR PARTNER OR EX-PARTNER?: NO

## 2025-05-06 SDOH — HEALTH STABILITY: MENTAL HEALTH: SUICIDAL BEHAVIOR (3 MONTHS): NO

## 2025-05-06 SDOH — HEALTH STABILITY: MENTAL HEALTH: WISH TO BE DEAD (PAST 1 MONTH): NO

## 2025-05-06 SDOH — SOCIAL STABILITY: SOCIAL INSECURITY
WITHIN THE LAST YEAR, HAVE YOU BEEN RAPED OR FORCED TO HAVE ANY KIND OF SEXUAL ACTIVITY BY YOUR PARTNER OR EX-PARTNER?: NO

## 2025-05-06 SDOH — HEALTH STABILITY: MENTAL HEALTH: SUICIDAL BEHAVIOR (LIFETIME): NO

## 2025-05-06 SDOH — HEALTH STABILITY: MENTAL HEALTH: SUICIDAL BEHAVIOR (LIFETIME): YES

## 2025-05-06 SDOH — SOCIAL STABILITY: SOCIAL INSECURITY: WITHIN THE LAST YEAR, HAVE YOU BEEN HUMILIATED OR EMOTIONALLY ABUSED IN OTHER WAYS BY YOUR PARTNER OR EX-PARTNER?: NO

## 2025-05-06 SDOH — HEALTH STABILITY: MENTAL HEALTH: HAVE YOU USED ANY PRESCRIPTION DRUGS OTHER THAN PRESCRIBED IN THE PAST 12 MONTHS?: NO

## 2025-05-06 SDOH — HEALTH STABILITY: MENTAL HEALTH: NON-SPECIFIC ACTIVE SUICIDAL THOUGHTS (PAST 1 MONTH): NO

## 2025-05-06 SDOH — SOCIAL STABILITY: SOCIAL INSECURITY
WITHIN THE LAST YEAR, HAVE YOU BEEN KICKED, HIT, SLAPPED, OR OTHERWISE PHYSICALLY HURT BY YOUR PARTNER OR EX-PARTNER?: NO

## 2025-05-06 SDOH — HEALTH STABILITY: MENTAL HEALTH: CURRENT SMOKER: 0

## 2025-05-06 SDOH — ECONOMIC STABILITY: FOOD INSECURITY: WITHIN THE PAST 12 MONTHS, YOU WORRIED THAT YOUR FOOD WOULD RUN OUT BEFORE YOU GOT THE MONEY TO BUY MORE.: NEVER TRUE

## 2025-05-06 SDOH — ECONOMIC STABILITY: FOOD INSECURITY: WITHIN THE PAST 12 MONTHS, THE FOOD YOU BOUGHT JUST DIDN'T LAST AND YOU DIDN'T HAVE MONEY TO GET MORE.: NEVER TRUE

## 2025-05-06 SDOH — HEALTH STABILITY: MENTAL HEALTH: HAVE YOU USED ANY SUBSTANCES (CANABIS, COCAINE, HEROIN, HALLUCINOGENS, INHALANTS, ETC.) IN THE PAST 12 MONTHS?: NO

## 2025-05-06 SDOH — SOCIAL STABILITY: SOCIAL INSECURITY: HAVE YOU HAD ANY THOUGHTS OF HARMING ANYONE ELSE?: NO

## 2025-05-06 SDOH — ECONOMIC STABILITY: HOUSING INSECURITY: DO YOU FEEL UNSAFE GOING BACK TO THE PLACE WHERE YOU ARE LIVING?: NO

## 2025-05-06 SDOH — SOCIAL STABILITY: SOCIAL INSECURITY: ARE YOU OR HAVE YOU BEEN THREATENED OR ABUSED PHYSICALLY, EMOTIONALLY, OR SEXUALLY BY ANYONE?: NO

## 2025-05-06 SDOH — HEALTH STABILITY: MENTAL HEALTH: WERE YOU ABLE TO COMPLETE ALL THE BEHAVIORAL HEALTH SCREENINGS?: YES

## 2025-05-06 SDOH — SOCIAL STABILITY: SOCIAL INSECURITY: VERBAL ABUSE: DENIES

## 2025-05-06 SDOH — SOCIAL STABILITY: SOCIAL INSECURITY: DO YOU FEEL ANYONE HAS EXPLOITED OR TAKEN ADVANTAGE OF YOU FINANCIALLY OR OF YOUR PERSONAL PROPERTY?: NO

## 2025-05-06 SDOH — SOCIAL STABILITY: SOCIAL INSECURITY: HAS ANYONE EVER THREATENED TO HURT YOUR FAMILY OR YOUR PETS?: NO

## 2025-05-06 SDOH — SOCIAL STABILITY: SOCIAL INSECURITY: ARE THERE ANY APPARENT SIGNS OF INJURIES/BEHAVIORS THAT COULD BE RELATED TO ABUSE/NEGLECT?: NO

## 2025-05-06 SDOH — SOCIAL STABILITY: SOCIAL INSECURITY: DOES ANYONE TRY TO KEEP YOU FROM HAVING/CONTACTING OTHER FRIENDS OR DOING THINGS OUTSIDE YOUR HOME?: NO

## 2025-05-06 SDOH — SOCIAL STABILITY: SOCIAL INSECURITY: PHYSICAL ABUSE: DENIES

## 2025-05-06 SDOH — SOCIAL STABILITY: SOCIAL INSECURITY: HAVE YOU HAD THOUGHTS OF HARMING ANYONE ELSE?: NO

## 2025-05-06 ASSESSMENT — LIFESTYLE VARIABLES
AUDIT-C TOTAL SCORE: 0
HOW MANY STANDARD DRINKS CONTAINING ALCOHOL DO YOU HAVE ON A TYPICAL DAY: PATIENT DOES NOT DRINK
SKIP TO QUESTIONS 9-10: 1
HOW OFTEN DO YOU HAVE 6 OR MORE DRINKS ON ONE OCCASION: NEVER
AUDIT-C TOTAL SCORE: 0
HOW OFTEN DO YOU HAVE A DRINK CONTAINING ALCOHOL: NEVER

## 2025-05-06 ASSESSMENT — PAIN SCALES - GENERAL
PAINLEVEL_OUTOF10: 0 - NO PAIN
PAINLEVEL_OUTOF10: 5 - MODERATE PAIN
PAINLEVEL_OUTOF10: 0 - NO PAIN

## 2025-05-06 ASSESSMENT — ACTIVITIES OF DAILY LIVING (ADL): LACK_OF_TRANSPORTATION: NO

## 2025-05-06 NOTE — TELEPHONE ENCOUNTER
Pt called this morning stating that she thinks that her water broke. She stated that she does currently feel FM as well as mild contractions. I transferred the call to L&D for her to have a conversation with the Midwife on call.

## 2025-05-06 NOTE — CARE PLAN
Problem: Risk for Suicide  Goal: Identifies supports this shift  Outcome: Progressing  Goal: No self harm this shift  Outcome: Progressing     Problem: Vaginal Birth or  Section  Goal: Fetal and maternal status remain reassuring during the birth process  Outcome: Progressing  Goal: Prevention of malpresentation/labor dystocia through delivery  Outcome: Progressing  Goal: Demonstrates labor coping techniques through delivery  Outcome: Progressing  Goal: Minimal s/sx of HDP and BP<160/110  Outcome: Progressing      The clinical goals for the shift include have a baby    Over the shift, the patient did make progress toward the following goals. Cervical changed noted from last cervical exam during shift. FHR tracing reassuring. Plan to start pitocin, patient educated on medication/labor augmentation and agreeable to plan.

## 2025-05-06 NOTE — ANESTHESIA PREPROCEDURE EVALUATION
Patient: Laura Alexander    Evaluation Method: In-person visit    Procedure Information    Date: 25  Procedure: Labor Consult         Relevant Problems   Cardiac (within normal limits)      Pulmonary  Childhood Asthma    (+) Asthma      Neuro  Hx of intentional self-harm    (+) Depression      Liver (within normal limits)      Endocrine (within normal limits)      Hematology   (+) Anemia affecting pregnancy in third trimester      Musculoskeletal (within normal limits)      HEENT (within normal limits)      ID (within normal limits)      GYN   (+) 33 weeks gestation of pregnancy ([facility])      Gravid and    (+) Labor and delivery indication for care or intervention ([facility])       Clinical information reviewed:    Allergies  Meds               NPO Detail:  No data recorded     OB/Gyn Evaluation    Present Pregnancy    Patient is pregnant now.   Obstetric History                Physical Exam    Airway  Mallampati: III  TM distance: >3 FB  Neck ROM: full     Cardiovascular - normal exam   Dental - normal exam     Pulmonary - normal exam   Abdominal            Anesthesia Plan    History of general anesthesia?: no  History of complications of general anesthesia?: unknown/emergency    ASA 2     epidural     The patient is not a current smoker.    Anesthetic plan and risks discussed with patient.  Use of blood products discussed with patient who consented to blood products.      Requested repeating CBC to get Plts count prior to the Epidural placement.

## 2025-05-06 NOTE — LETTER
May 9, 2025     Patient: Laura Alexander   YOB: 2005   Date of Visit: 5/5/2025       To Whom It May Concern:    Jake Rivas accompanied his partner Laura Alexander during her admission, birth, and postpartum course. Please excuse him from work from 5/6/25 until 5/9/25.    If you have any questions or concerns, please don't hesitate to call.    Sincerely,         TRISTAN Tinoco

## 2025-05-06 NOTE — H&P
" OB Triage H&P    Assessment/Plan    Laura Alexander is a 19 y.o.  at 38w5d, ZARIA: 5/15/2025, by 13w6d Ultrasound, who presents to triage with c/f SROM.    SROM  Reports SROM with large gush of fluid at 0330 AM 25  Initial exam: negative pooling/nitrazine/ferning  SVE: 3/50/-3 (from 2cm yesterday)  [place]: ctx q 10 mins, but patient comfortable  Ultrasound notable for oligohydramnios, no 2x2cm pocket visualized  Given gestational age, patient account of SROM, and new oligohydramnios, recommending admission for SROM with possible augmentation of labor    Pregnancy Also Notable for:  Lapse in prenatal care since 34w5d  GBS collected , pending  Anemia, last Hgb 3/4 with Hgb 10.2, taking PO iron  Hep B non-immune, for pp vax  Hx MDD, cutting in  with \"intention to die\", overdose in . Denied SI/HI at time of admission    Plan    -Admit to L&D  -NST reactive  -Scanned cephalic  -EFW 6.5 lbs by [facility]  -GBS pending, will defer currently in setting of gestational age, will discuss initiation of PCN if ruptured > 18 hours  -BCM: considering ppParagard vs interval patch, consented for possible IUD placement    Discussed plan and reviewed with: Dr. Michael, to also discuss with Karen Corea, BENEDICT CNP    Khadijah Loyd MD, MPH  Obstetrics & Gynecology, PGY-1     I saw and evaluated the patient. I personally obtained the key and critical portions of the history and physical exam or was physically present for key and critical portions performed by the resident/fellow. I reviewed the resident/fellow's documentation and discussed the patient with the resident/fellow. I agree with the resident/fellow's medical decision making as documented in the note.    Karen Corea, APRN-BENEDICT, APRN-CNP     Subjective   Laura reports she felt a gush of fluid at around 0330 this morning and had some contractions that started afterwards. She reports that the contractions slowed down and she was able " to rest and sleep through the morning prior to presentation for evaluation.   Denies VB. Good fetal movement    Prenatal Provider: Halley Dietrich, [place]    OB History    Para Term  AB Living   2 0 0 0 1 0   SAB IAB Ectopic Multiple Live Births   0 1 0 0 0      # Outcome Date GA Lbr Vahid/2nd Weight Sex Type Anes PTL Lv   2 Current            1 IAB 05/15/22     1st Tri Surg          Surgical History[1]    Social History     Tobacco Use    Smoking status: Never    Smokeless tobacco: Never   Substance Use Topics    Alcohol use: Not Currently       Allergies[2]    Prescriptions Prior to Admission[3]  Objective     Last Vitals  Temp Pulse Resp BP MAP O2 Sat   36 °C (96.8 °F) 87 16 111/76 88 99 %     Blood Pressures         2025  1056             BP: 111/76             Physical Exam  General: NAD, mood appropriate  Cardiopulmonary: warm and well perfused, breathing comfortably on room air  Abdomen: Gravid, non-tender  Extremities: Symmetric  Speculum Exam: no pooling of fluid seen, Nitrazine test is negative, Ferning test is negative  Cervix: 3 /50 /-3     Chaperone Present: Yes.  Chaperone Name/Title: Syed Hernandez, RN  Examination Chaperoned: Gynecological Exam     Fetal Monitoring  130/moderate/+accels/-decels   [place] q 10  NST Reactive    BSUS:   Position: Cephalic   Amniotic Fluid: no discrete 2x2 pocket visualized, subjectively low fluid          Lab Results   Component Value Date    WBC 8.4 2025    HGB 10.2 (L) 2025    HCT 31.7 (L) 2025     2025    ALT 7 2023    AST 17 2023     2023    K 4.0 2023     2023    CREATININE 0.79 2023    BUN 13 2023    CO2 23 2023    TSH 1.24 2020    INR 1.0 2020    HGBA1C 4.8 2025                [1]   Past Surgical History:  Procedure Laterality Date    D&C FIRST TRIMESTER / TX INCOMPLETE / MISSED / SEPTIC / INDUCED      [2] No Known  Allergies  [3]   Medications Prior to Admission   Medication Sig Dispense Refill Last Dose/Taking    ferrous gluconate (Fergon) 240 (27 Fe) MG tablet Take 1 tablet by mouth twice a day every other day 90 tablet 3 Past Month    prenatal vitamin, iron-folic, 27 mg iron-800 mcg folic acid tablet Take 1 tablet by mouth once daily. 90 tablet 3 Past Month    docusate sodium (Colace) 100 mg capsule Take 1 capsule (100 mg) by mouth as needed at bedtime for constipation. 30 capsule 3 Unknown

## 2025-05-06 NOTE — Clinical Note
Laura Alexander was seen and treated in our emergency department on 5/5/2025.  She may return to work on 05/07/2025.       If you have any questions or concerns, please don't hesitate to call.      Joey Vogel, DO

## 2025-05-06 NOTE — SIGNIFICANT EVENT
Assessment    19 y.o.  at 38w5d  FHT Category 1  Latent labor, SROM x15 hr  GBS known  Plan    Discussed augmentation with pitocin to regulate contraction pattern, patient agreeable.  Encourage frequent position changes as tolerated  Encourage ambulation as tolerated  Plan PCN GBS prophylaxis after >18 hours of ROM  Continue assessment of maternal and fetal wellbeing  Recheck as clinically indicated by maternal or fetal status  Anticipate active phase of labor    Karen Corea, APRN-CNM, APRN-CNP    Subjective:  Laura Alexander is comfortable, denies feeling contractions. Agreeable to CE.    Objective:  Fetal Monitoring      Baseline FHR: 135 per minute  Variability: moderate  Accelerations: yes  Decelerations: none  TOCO: Contraction Frequency: 1-6     Cervical Exam: 4 cm dilated, 80 effaced, -2 station; no forebag felt on examine    Membrane Status: SROM  Rupture Date: 25  Rupture Time: 0300  Fluid Color: Clear    Vitals:    25 1105 25 1110 25 1537   BP:   114/75   Pulse: 90 87 88   Resp:   17   Temp:   36.4 °C (97.5 °F)   TempSrc:   Temporal   SpO2: 100% 99% 99%   Weight:      Height:

## 2025-05-07 ENCOUNTER — ANESTHESIA (OUTPATIENT)
Dept: OBSTETRICS AND GYNECOLOGY | Facility: HOSPITAL | Age: 20
End: 2025-05-07
Payer: COMMERCIAL

## 2025-05-07 ENCOUNTER — ANESTHESIA EVENT (OUTPATIENT)
Dept: OBSTETRICS AND GYNECOLOGY | Facility: HOSPITAL | Age: 20
End: 2025-05-07
Payer: COMMERCIAL

## 2025-05-07 PROBLEM — Z3A.38 38 WEEKS GESTATION OF PREGNANCY (HHS-HCC): Status: RESOLVED | Noted: 2025-03-04 | Resolved: 2025-05-07

## 2025-05-07 PROBLEM — O41.00X0: Status: RESOLVED | Noted: 2025-05-06 | Resolved: 2025-05-07

## 2025-05-07 PROBLEM — O99.013 ANEMIA AFFECTING PREGNANCY IN THIRD TRIMESTER: Status: RESOLVED | Noted: 2025-03-05 | Resolved: 2025-05-07

## 2025-05-07 PROBLEM — O42.90: Status: RESOLVED | Noted: 2025-05-06 | Resolved: 2025-05-07

## 2025-05-07 PROCEDURE — 59050 FETAL MONITOR W/REPORT: CPT

## 2025-05-07 PROCEDURE — 2500000004 HC RX 250 GENERAL PHARMACY W/ HCPCS (ALT 636 FOR OP/ED): Mod: JZ,SE | Performed by: NURSE PRACTITIONER

## 2025-05-07 PROCEDURE — 2720000007 HC OR 272 NO HCPCS

## 2025-05-07 PROCEDURE — 3700000014 EPIDURAL BLOCK: Performed by: ANESTHESIOLOGY

## 2025-05-07 PROCEDURE — 7210000002 HC LABOR PER HOUR

## 2025-05-07 PROCEDURE — 59409 OBSTETRICAL CARE: CPT | Performed by: ADVANCED PRACTICE MIDWIFE

## 2025-05-07 PROCEDURE — 1100000001 HC PRIVATE ROOM DAILY

## 2025-05-07 PROCEDURE — 2500000001 HC RX 250 WO HCPCS SELF ADMINISTERED DRUGS (ALT 637 FOR MEDICARE OP): Mod: SE

## 2025-05-07 PROCEDURE — 7100000016 HC LABOR RECOVERY PER HOUR

## 2025-05-07 PROCEDURE — 2500000004 HC RX 250 GENERAL PHARMACY W/ HCPCS (ALT 636 FOR OP/ED): Mod: JZ,SE

## 2025-05-07 RX ORDER — OXYTOCIN 10 [USP'U]/ML
10 INJECTION, SOLUTION INTRAMUSCULAR; INTRAVENOUS ONCE AS NEEDED
Status: DISCONTINUED | OUTPATIENT
Start: 2025-05-07 | End: 2025-05-09 | Stop reason: HOSPADM

## 2025-05-07 RX ORDER — ONDANSETRON HYDROCHLORIDE 2 MG/ML
4 INJECTION, SOLUTION INTRAVENOUS EVERY 6 HOURS PRN
Status: DISCONTINUED | OUTPATIENT
Start: 2025-05-07 | End: 2025-05-09 | Stop reason: HOSPADM

## 2025-05-07 RX ORDER — ADHESIVE BANDAGE
10 BANDAGE TOPICAL
Status: DISCONTINUED | OUTPATIENT
Start: 2025-05-07 | End: 2025-05-09 | Stop reason: HOSPADM

## 2025-05-07 RX ORDER — HYDRALAZINE HYDROCHLORIDE 20 MG/ML
5 INJECTION INTRAMUSCULAR; INTRAVENOUS ONCE AS NEEDED
Status: DISCONTINUED | OUTPATIENT
Start: 2025-05-07 | End: 2025-05-09 | Stop reason: HOSPADM

## 2025-05-07 RX ORDER — NALBUPHINE HYDROCHLORIDE 10 MG/ML
10 INJECTION INTRAMUSCULAR; INTRAVENOUS; SUBCUTANEOUS ONCE
Status: COMPLETED | OUTPATIENT
Start: 2025-05-07 | End: 2025-05-07

## 2025-05-07 RX ORDER — DIPHENHYDRAMINE HCL 25 MG
25 CAPSULE ORAL EVERY 6 HOURS PRN
Status: DISCONTINUED | OUTPATIENT
Start: 2025-05-07 | End: 2025-05-09 | Stop reason: HOSPADM

## 2025-05-07 RX ORDER — TRANEXAMIC ACID 1 G/10ML
1000 INJECTION, SOLUTION INTRAVENOUS ONCE AS NEEDED
Status: DISCONTINUED | OUTPATIENT
Start: 2025-05-07 | End: 2025-05-09 | Stop reason: HOSPADM

## 2025-05-07 RX ORDER — ONDANSETRON 4 MG/1
4 TABLET, FILM COATED ORAL EVERY 6 HOURS PRN
Status: DISCONTINUED | OUTPATIENT
Start: 2025-05-07 | End: 2025-05-09 | Stop reason: HOSPADM

## 2025-05-07 RX ORDER — POLYETHYLENE GLYCOL 3350 17 G/17G
17 POWDER, FOR SOLUTION ORAL 2 TIMES DAILY PRN
Status: DISCONTINUED | OUTPATIENT
Start: 2025-05-07 | End: 2025-05-09 | Stop reason: HOSPADM

## 2025-05-07 RX ORDER — METHYLERGONOVINE MALEATE 0.2 MG/ML
0.2 INJECTION INTRAVENOUS ONCE AS NEEDED
Status: DISCONTINUED | OUTPATIENT
Start: 2025-05-07 | End: 2025-05-09 | Stop reason: HOSPADM

## 2025-05-07 RX ORDER — ACETAMINOPHEN 325 MG/1
975 TABLET ORAL EVERY 6 HOURS
Status: DISCONTINUED | OUTPATIENT
Start: 2025-05-07 | End: 2025-05-09 | Stop reason: HOSPADM

## 2025-05-07 RX ORDER — LOPERAMIDE HYDROCHLORIDE 2 MG/1
4 CAPSULE ORAL EVERY 2 HOUR PRN
Status: DISCONTINUED | OUTPATIENT
Start: 2025-05-07 | End: 2025-05-09 | Stop reason: HOSPADM

## 2025-05-07 RX ORDER — SIMETHICONE 80 MG
80 TABLET,CHEWABLE ORAL 4 TIMES DAILY PRN
Status: DISCONTINUED | OUTPATIENT
Start: 2025-05-07 | End: 2025-05-09 | Stop reason: HOSPADM

## 2025-05-07 RX ORDER — CARBOPROST TROMETHAMINE 250 UG/ML
250 INJECTION, SOLUTION INTRAMUSCULAR ONCE AS NEEDED
Status: DISCONTINUED | OUTPATIENT
Start: 2025-05-07 | End: 2025-05-09 | Stop reason: HOSPADM

## 2025-05-07 RX ORDER — IBUPROFEN 600 MG/1
600 TABLET, FILM COATED ORAL EVERY 6 HOURS
Status: DISCONTINUED | OUTPATIENT
Start: 2025-05-07 | End: 2025-05-09 | Stop reason: HOSPADM

## 2025-05-07 RX ORDER — OXYTOCIN/0.9 % SODIUM CHLORIDE 30/500 ML
60 PLASTIC BAG, INJECTION (ML) INTRAVENOUS ONCE AS NEEDED
Status: DISCONTINUED | OUTPATIENT
Start: 2025-05-07 | End: 2025-05-09 | Stop reason: HOSPADM

## 2025-05-07 RX ORDER — MISOPROSTOL 200 UG/1
800 TABLET ORAL ONCE AS NEEDED
Status: DISCONTINUED | OUTPATIENT
Start: 2025-05-07 | End: 2025-05-09 | Stop reason: HOSPADM

## 2025-05-07 RX ORDER — DIPHENHYDRAMINE HYDROCHLORIDE 50 MG/ML
25 INJECTION, SOLUTION INTRAMUSCULAR; INTRAVENOUS EVERY 6 HOURS PRN
Status: DISCONTINUED | OUTPATIENT
Start: 2025-05-07 | End: 2025-05-09 | Stop reason: HOSPADM

## 2025-05-07 RX ORDER — LABETALOL HYDROCHLORIDE 5 MG/ML
20 INJECTION, SOLUTION INTRAVENOUS ONCE AS NEEDED
Status: DISCONTINUED | OUTPATIENT
Start: 2025-05-07 | End: 2025-05-09 | Stop reason: HOSPADM

## 2025-05-07 RX ADMIN — PENICILLIN G 3 MILLION UNITS: 3000000 INJECTION, SOLUTION INTRAVENOUS at 01:12

## 2025-05-07 RX ADMIN — NALBUPHINE HYDROCHLORIDE 10 MG: 10 INJECTION, SOLUTION INTRAMUSCULAR; INTRAVENOUS; SUBCUTANEOUS at 00:03

## 2025-05-07 RX ADMIN — IBUPROFEN 600 MG: 600 TABLET ORAL at 21:15

## 2025-05-07 RX ADMIN — SODIUM CHLORIDE, SODIUM LACTATE, POTASSIUM CHLORIDE, AND CALCIUM CHLORIDE 500 ML: .6; .31; .03; .02 INJECTION, SOLUTION INTRAVENOUS at 07:58

## 2025-05-07 RX ADMIN — PENICILLIN G 3 MILLION UNITS: 3000000 INJECTION, SOLUTION INTRAVENOUS at 05:34

## 2025-05-07 RX ADMIN — ACETAMINOPHEN 975 MG: 325 TABLET, FILM COATED ORAL at 09:15

## 2025-05-07 RX ADMIN — NALBUPHINE HYDROCHLORIDE 10 MG: 10 INJECTION, SOLUTION INTRAMUSCULAR; INTRAVENOUS; SUBCUTANEOUS at 04:28

## 2025-05-07 RX ADMIN — ACETAMINOPHEN 975 MG: 325 TABLET, FILM COATED ORAL at 15:27

## 2025-05-07 RX ADMIN — ACETAMINOPHEN 975 MG: 325 TABLET, FILM COATED ORAL at 21:15

## 2025-05-07 RX ADMIN — IBUPROFEN 600 MG: 600 TABLET ORAL at 15:27

## 2025-05-07 RX ADMIN — IBUPROFEN 600 MG: 600 TABLET ORAL at 09:15

## 2025-05-07 RX ADMIN — Medication 60 MILLI-UNITS/MIN: at 09:08

## 2025-05-07 SDOH — HEALTH STABILITY: MENTAL HEALTH: CURRENT SMOKER: 0

## 2025-05-07 ASSESSMENT — PAIN SCALES - GENERAL
PAINLEVEL_OUTOF10: 0 - NO PAIN
PAINLEVEL_OUTOF10: 5 - MODERATE PAIN
PAINLEVEL_OUTOF10: 0 - NO PAIN
PAINLEVEL_OUTOF10: 0 - NO PAIN

## 2025-05-07 NOTE — ANESTHESIA PREPROCEDURE EVALUATION
Patient: Laura Alexander    Evaluation Method: In-person visit    Procedure Information    Date: 05/06/25  Procedure: Labor Consult         Relevant Problems   Cardiac (within normal limits)      Pulmonary  Childhood Asthma    (+) Asthma      Neuro  Hx of intentional self-harm    (+) Depression      Liver (within normal limits)      Endocrine (within normal limits)      Musculoskeletal (within normal limits)      HEENT (within normal limits)      ID (within normal limits)      GYN   (+) 38 weeks gestation of pregnancy ([facility])       Clinical information reviewed:    Allergies  Meds  Problems              NPO Detail:  No data recorded     OB/Gyn Evaluation    Present Pregnancy    Patient is pregnant now.   Obstetric History                Physical Exam    Airway  Mallampati: III  TM distance: >3 FB  Neck ROM: full     Cardiovascular - normal exam   Dental - normal exam     Pulmonary - normal exam   Abdominal            Anesthesia Plan    History of general anesthesia?: no  History of complications of general anesthesia?: unknown/emergency    ASA 2     epidural     The patient is not a current smoker.    Anesthetic plan and risks discussed with patient.  Use of blood products discussed with patient who consented to blood products.      Requested repeating CBC to get Plts count prior to the Epidural placement.

## 2025-05-07 NOTE — SIGNIFICANT EVENT
Assessment    19 y.o.  at 38w6d  FHT Category 1  Latent labor  SROM x~28 hr  GBS unknown  Plan    Discussed with patient recommendation to continue increasing pitocin dose per protocol (patient has declined dose increases throughout the night with RN), with ultimate expectation of active labor by 18 hours of SROM/pitocin. Patient tearful with lack of CE change, agreeable to pitocin dose increases per protocol.    Encourage frequent position changes as tolerated  Encourage ambulation as tolerated  Continue pitocin per protocol  Pain management per patient request  PCN GBS prophylaxis  Continue assessment of maternal and fetal wellbeing  Recheck as clinically indicated by maternal or fetal status  Anticipate active phase of labor    Karen Corea, APRN-CNM, APRN-CNP    Subjective:  Laura Alexander is coping well with contractions, agreeable to CE.     Objective:  Fetal Monitoring      Baseline FHR: 130 per minute  Variability: moderate  Accelerations: yes  Decelerations: none  TOCO: Contraction Frequency: 1.5-6     Cervical Exam: 4 cm dilated, 80 effaced, -2 station (Unchanged from prior 2 exams)    Membrane Status: SROM  Rupture Date: 25  Rupture Time: 0300  Fluid Color: Clear    Pitocin is at 12 evert-units/min.    Vitals:    25 0342 25 0432 25 0531 25 0532   BP: 120/80 115/66  110/62   Pulse: 100 71  80   Resp: 16 16  18   Temp: 36.2 °C (97.2 °F) 36.5 °C (97.7 °F) 36.1 °C (97 °F)    TempSrc: Temporal Temporal Temporal    SpO2:    99%   Weight:       Height:

## 2025-05-07 NOTE — PROGRESS NOTES
Intrapartum Progress Note  Subjective   Laura Alexander is a 19 y.o.  at 38w6d. ZARIA: 5/15/2025, by Ultrasound.     Patient coping well with contractions. FOB at the bedside. Patient ok with going up on pitocin.     Objective   Last Vitals:  Temp Pulse Resp BP MAP Pulse Ox   36.5 °C (97.7 °F) 78 16 118/74   99 %     FHTs: baseline 125, moderate variability, +accels, no decels  UC: 1-4 min apart    Assessment   19 y.o.  at 38w6d  FHT Category 1  Latent labor  SROM x~29 hr  Pit since 1900; x12.5hrs   GBS unknown    Plan   -continue to titrate pitocin per protocol; discussed with patient about importance of this  -frequent position changes  -pain management per pt request  -PCN GBS prophylaxis  -continue to monitor FHTs and VS closely   -recheck as clinically indicated by maternal or fetal status  -reassess in 3-4 hrs or prn     TRISTAN Rios APRN-CNM

## 2025-05-07 NOTE — SIGNIFICANT EVENT
Assessment    19 y.o.  at 38w6d  FHT Category 1  Latent labor  SROM ~23 hr  GBS unknown  Plan    Encourage frequent position changes as tolerated  Encourage ambulation as tolerated  Continue pitocin per protocol  Pain management per patient request  PCN GBS prophylaxis  Continue assessment of maternal and fetal wellbeing  Recheck as clinically indicated by maternal or fetal status  Anticipate active phase of labor    Karen Corea, APRN-CNM, APRN-CNP    Subjective:  Laura Alexander is sleeping comfortable after nubain      Objective:  Fetal Monitoring      Baseline FHR: 125 per minute  Variability: moderate  Accelerations: yes  Decelerations: none  TOCO: Contraction Frequency: 1.5-4     Cervical Exam: deferred    Membrane Status: SROM  Rupture Date: 25  Rupture Time: 0300  Fluid Color: Clear    Pitocin is at 8 evert-units/min.    Vitals:    25 2227 25 2342 25 0114   BP: 108/52 131/85 100/57   Pulse: 80 75 80   Resp: 20  16   Temp: 36.6 °C (97.9 °F)  (!) 35.9 °C (96.6 °F)   TempSrc: Temporal  Temporal   SpO2: 98%  98%   Weight:      Height:

## 2025-05-07 NOTE — SIGNIFICANT EVENT
Assessment    19 y.o.  at 38w5d  FHT Category 1  Latent labor, SROM x19 hr  New onset of gestational thrombocytopenia, plt = 133 on admission  GBS unknown  Plan    Encourage frequent position changes as tolerated  Encourage ambulation as tolerated  Augmentation with pitocin, plan to increase dose per protocol  Pain management per patient request  PCN GBS prophylaxis  Continue assessment of maternal and fetal wellbeing  Recheck as clinically indicated by maternal or fetal status  Anticipate active phase of labor    Karen Corea, APRN-CNM, APRN-CNP    Subjective:  Laura Byrdmarija Murphye is reporting intermittent contractions, overall comfortable.     Objective:  Fetal Monitoring      Baseline FHR: 140 per minute  Variability: moderate  Accelerations: yes  Decelerations: none  TOCO: Contraction Frequency: 2-4    Cervical Exam: deferred    Membrane Status: SROM  Rupture Date: 25  Rupture Time: 0300  Fluid Color: Clear    Pitocin is at 6 evert-units/min.    Vitals:    25 1941 25 2041 25 2130 25 2131   BP: 111/67 112/62  110/67   Pulse: (!) 111 88  87   Resp: 16 18     Temp: 36.4 °C (97.5 °F) 36.4 °C (97.5 °F) 36.7 °C (98.1 °F)    TempSrc: Temporal Temporal     SpO2:       Weight:       Height:

## 2025-05-07 NOTE — L&D DELIVERY NOTE
Vaginal Delivery Note    Patient Name: Laura Alexander  : 2005  MRN: [id number]  Age: 19 y.o.    /Para:   Gestational Age: 38w6d    Date of Delivery: 2025    Procedure: Normal Spontaneous Vaginal Delivery    Delivery Provider: REMINGTON Greenberg, Halley Dietrich CNM    Description of Procedure:  Patient sitting for epidural. Epidural catheter placed, no medication administered due to patient urge to push. Called to room by RN. Upon entering the room, baby in patient's arms.   Delivery of viable infant under no anesthesia. Delayed clamping was performed. The infant was placed skin to skin. Cord gases were not sent.  Cord blood was collected. Placenta delivered intact and fundus was firm. Calcifications noted on placenta.    Left labial Laceration identified, repair not necessary. Right labial abrasion noted.    Findings:   Amniotic fluid Clear, Female infant in       presentation, APGARS 8 , 9 .  Birth Weight 2.96 kg.    Complications: None    Quantitative Blood Loss:   Delivery Blood Loss   Intrapartum & Postpartum: 25 2018 - 25 0850    Delivery Admission: 25 1045 - 25 0850         Intrapartum & Postpartum Delivery Admission    None               Blood products:      Uterotonics/Hemostatic Agent: IV Pitocin 30 units    Specimen:   Placenta  Delivered: 2025  8:33 AM  Appearance: Intact  Removal: Spontaneous    Disposition: discarded    Sponge/Instrument/Needle Counts: The sponge, lap and needle counts were correct.    Patient Disposition: Patient recovering on labor and delivery in stable condition.    Additional Procedures:  None    Angel Alexander [[id number]]      Labor Events    Rupture date/time: 2025 0300  Rupture type: Spontaneous  Fluid color: Clear  Fluid odor: None  Labor type: Spontaneous Onset of Labor  Labor allowed to proceed with plans for an attempted vaginal birth?: Yes  Augmentation: Oxytocin  Augmentation indications:  Prolonged ROM, Ineffective Contraction Pattern  Complications: None       Labor Event Times    Labor onset date/time: 2025 1320  Dilation complete date/time: 2025 0810  Start pushing date/time: 2025 08:15       Labor Length    1st stage: 18h 50m  2nd stage: 0h 08m  3rd stage: 0h 15m       Placenta    Placenta delivery date/time: 2025 08:33  Placenta removal: Spontaneous  Placenta appearance: Intact  Placenta disposition: discarded       Cord    Vessels: 3 vessels  Complications: None  Delayed cord clamping?: Yes  Cord clamped date/time: 2025 08:25:00  Cord blood disposition: Lab  Gases sent?: No       Lacerations    Episiotomy: None  Perineal laceration: None  Labial laceration?: Yes  Labial laceration location: left  Labial laceration repaired?: No  Repair suture: None       Anesthesia    Method: None       Operative Delivery    Forceps attempted?: No  Vacuum extractor attempted?: No       Shoulder Dystocia    Shoulder dystocia present?: No       [place] Delivery    Time head delivered: 2025 08:16:00  Birth date/time: 2025 08:18:00  Delivery type: Vaginal, Spontaneous  Complications: None       Resuscitation    Method: None       Apgars    Living status: Living  Apgar Component Scores:  1 min.:  5 min.:  10 min.:  15 min.:  20 min.:    Skin color:  0  1       Heart rate:  2  2       Reflex irritability:  2  2       Muscle tone:  2  2       Respiratory effort:  2  2       Total:  8  9       Apgars assigned by: MAZIN NEWTON       Delivery Providers    Delivering clinician: TRISTAN Tinoco   Provider Role    Olga Dao RN Delivery Nurse    Mita Sweeney, RN Nursery Nurse     Resident                TRISTAN Tinoco

## 2025-05-07 NOTE — LACTATION NOTE
Lactation Consultant Note  Lactation Consultation  Reason for Consult: Initial assessment  Consultant Name: Porsche Hunter RN IBCLC    Maternal Information  Infant to breast within first 2 hours of birth?: Yes    Maternal Assessment  Breast Assessment: Medium, Small, Compressible, Soft (expressible bilat)  Nipple Assessment: Intact, Erect  Areola Assessment: Normal (areolas rasied, soft, mother reported as her baseline appearance)    Infant Assessment  Infant Behavior: Deep sleep  Infant Assessment:  (deferred)    Feeding Assessment  Nutrition Source: Breastmilk  Feeding Method: Nursing at the breast  Unable to assess infant feeding at this time:  (infant asleep in bassinet, last  a little over an hour ago)    LATCH TOOL       Breast Pump       Other OB Lactation Tools       Patient Follow-up  Inpatient Lactation Follow-up Needed : Yes  Outpatient Lactation Follow-up: Recommended    Other OB Lactation Documentation  Infant Risk Factors: Early term birth 37-39 weeks    Recommendations/Summary  I did not view a latch with this visit, infant swaddled and asleep in bassinet, 8 HOL at time of consult. Mother reported she feels that breastfeeding is going well so far, and that infant has been latching comfortably for feedings. I educated mother on various breastfeeding topics including typical  infant feeding cues, feeding infant on demand based on feeding cues, and typical feeding patterns in the first 24 hours of life. If infant not showing feeding cues and it has been 3 hours since infant's last feed or attempted feed,  I encouraged mother to place infant skin to skin. I reviewed the benefits of skin to skin. I demonstrated hand expression to mother and encouraged the expression of colostrum to the nipple prior to latching. We briefly discussed latching technique as well as the characteristics and benefits of a deep and proper latch. I encouraged mother to use pillow support, and reviewed the use of breast  compression and stimulation to keep the baby feeding at the breast longer. We discussed typical  feeding lengths and allowing infant to end the feedings.     Mother has a breast pump for home use and I reviewed outpatient lactation resources available to her. I encouraged her to call for any questions, and for latch check/assistance as needed with infant's next feeding.

## 2025-05-07 NOTE — CARE PLAN
Problem: Risk for Suicide  Goal: Accepts medications as prescribed/needed this shift  2025 by Judy Dey RN  Outcome: Progressing  2025 by Judy Dey RN  Outcome: Progressing  Goal: Identifies supports this shift  2025 by Judy Dey RN  Outcome: Progressing  2025 by Judy Dey RN  Outcome: Progressing  Goal: Makes needs known through verbalization or behaviors this shift  2025 by Judy Dey RN  Outcome: Progressing  2025 by Judy Dey RN  Outcome: Progressing  Goal: No self harm this shift  2025 by Judy Dey RN  Outcome: Progressing  2025 by Judy Dey RN  Outcome: Progressing  Goal: Read Safety Guidelines this shift  2025 by Judy Dey RN  Outcome: Progressing  2025 by Judy Dey RN  Outcome: Progressing  Goal: Complete Mental Health Safety Plan (psychiatry only) this shift  2025 by Judy Dey RN  Outcome: Progressing  2025 by Judy Dey RN  Outcome: Progressing     Problem: Vaginal Birth or  Section  Goal: Minimal s/sx of HDP and BP<160/110  2025 by Judy Dey RN  Outcome: Progressing  2025 by Judy Dey RN  Outcome: Progressing     Problem: Postpartum  Goal: No s/sx infection  2025 by Judy Dey RN  Outcome: Progressing  2025 by Judy Dey RN  Outcome: Progressing  Goal: No s/sx of hemorrhage  2025 by Judy Dey RN  Outcome: Progressing  2025 by Judy Dey RN  Outcome: Progressing     The patient's goals for the shift include rest and bond with baby    The clinical goals for the shift include VSS, bleeding and swelling minimal, pain controlled with medications, and adequately feeding infant.    Over the shift the patient made progress to goals above. Patient maintained VS WNL, bleeding and swelling minimal, and pain controlled with medications. Patient is  motivated to continue learning about breastfeeding and latching infant.

## 2025-05-07 NOTE — PROGRESS NOTES
Intrapartum Progress Note  Subjective   Laura Alexander is a 19 y.o.  at 38w6d. ZARIA: 5/15/2025, by Ultrasound.     Feeling more pressure like she has to have a bowel movement. Thinking about getting epidural, agreeable to cervical exam. FOB and  at bedside.     Objective   Last Vitals:  Temp Pulse Resp BP MAP Pulse Ox   36.5 °C (97.7 °F) 81 16 118/74   100 %     FHTs: baseline 130, moderate variability, + accels, +early decels, +variable decels, +lates   UC: 2-3 min apart  SVE: /-1 (new examiner)     Assessment   19 y.o.  at 38w6d  FHT Category II  Latent labor  SROM x~29 hr    Plan   -resuscitation measures in place: patient repositioned, IV fluid bolus started, will consider IUPC if variables continue   -continue to titrate pitocin per protocol  -frequent position changes  -pain management per pt request  -PCN GBS prophylaxis  -continue to monitor FHTs and VS closely   -recheck as clinically indicated by maternal or fetal status   -reassess in 30 min - 1hr or prn     TRISTAN Rios APRN-CNM

## 2025-05-07 NOTE — SIGNIFICANT EVENT
Assessment    19 y.o.  at 38w5d  FHT Category 1  Latent labor  SROM ~21 hr  GBS unknown  Plan    Reviewed pain management options with patient  Nubain desired and ordered  Encourage frequent position changes as tolerated  Encourage ambulation as tolerated  Continue pitocin per protocol  Pain management per patient request  PCN GBS prophylaxis  Continue assessment of maternal and fetal wellbeing  Recheck as clinically indicated by maternal or fetal status  Anticipate active phase of labor    Karen Corea, APRN-CNM, APRN-CNP    Subjective:  Laura Alexander is rex uncomfortably, requesting CE.    Objective:  Fetal Monitoring      Baseline FHR: 150 per minute  Variability: moderate  Accelerations: yes  Decelerations: none  TOCO: Contraction Frequency: 1.5-2.5     Cervical Exam: 4 cm dilated, 80 effaced, -2 station (unchanged from prior exam)    Membrane Status: SROM  Rupture Date: 25  Rupture Time: 0300  Fluid Color: Clear    Pitocin is at 8 evert-units/min.    Vitals:    25 2131 25 2227 25 2342   BP: 110/67 108/52 131/85   Pulse: 87 80 75   Resp: 18 20    Temp:  36.6 °C (97.9 °F)    TempSrc:  Temporal    SpO2: 97% 98%    Weight:      Height:

## 2025-05-07 NOTE — ANESTHESIA PROCEDURE NOTES
Epidural Block    Patient location during procedure: OB  Start time: 5/7/2025 8:05 AM  End time: 5/7/2025 8:15 AM  Reason for block: labor analgesia  Staffing  Performed: RAFAEL   Authorized by: Yoandy Lanza MD    Performed by: RAFEAL Richey    Preanesthetic Checklist  Completed: patient identified, IV checked, risks and benefits discussed, surgical consent, pre-op evaluation, timeout performed and sterile techniques followed  Block Timeout  RN/Licensed healthcare professional reads aloud to the Anesthesia provider and entire team: Patient identity, procedure with side and site, patient position, and as applicable the availability of implants/special equipment/special requirements.  Patient on coagulant treatment: no  Timeout performed at: 5/7/2025 8:05 AM  Block Placement  Patient position: sitting  Prep: ChloraPrep  Sterility prep: mask, gloves, drape and cap  Sedation level: no sedation  Patient monitoring: heart rate, continuous pulse oximetry and blood pressure  Approach: midline  Local numbing: lidocaine 1% to skin and subcutaneous tissues  Vertebral space: lumbar  Lumbar location: L3-L4  Epidural  Loss of resistance technique: saline  Guidance: landmark technique        Needle  Needle type: Tuohy   Needle gauge: 17  Needle length: 8.9cm  Needle insertion depth: 4.5 cm  Catheter type: multi-orifice  Catheter size: 19 G  Catheter at skin depth: 9.5 cm  Catheter securement method: clear occlusive dressing and liquid medical adhesive    Test dose: Other (see comment) (Not given)  Test dose: Other (see comment) (Not given)            Assessment  Number of attempts: 1  Procedure assessment: patient tolerated procedure well with no immediate complications  Additional Notes  Called to room for patient request for an epidural. Patient is currently on her side rex very frequently, very uncomfortable and stating that she is feeling a lot of pressure. Patient is still amenable to getting the epidural  and would like to proceed in between contractions. Epidural placed without issue by MSA2 student. Immediately prior to test dose being given, the patient stated that she felt the baby coming, bedside RN noticed the head of the baby and patient was immediately laid down and OB team called STAT for delivery. The epidural catheter was secured and taped to the patient's back while laying on side. No medication (including test dose) was given through the epidural and baby was delivered immediately after being laid down.

## 2025-05-08 LAB — GP B STREP GENITAL QL CULT: NORMAL

## 2025-05-08 PROCEDURE — 2500000001 HC RX 250 WO HCPCS SELF ADMINISTERED DRUGS (ALT 637 FOR MEDICARE OP): Mod: SE | Performed by: STUDENT IN AN ORGANIZED HEALTH CARE EDUCATION/TRAINING PROGRAM

## 2025-05-08 PROCEDURE — 1100000001 HC PRIVATE ROOM DAILY

## 2025-05-08 PROCEDURE — 99233 SBSQ HOSP IP/OBS HIGH 50: CPT

## 2025-05-08 RX ADMIN — ACETAMINOPHEN 975 MG: 325 TABLET, FILM COATED ORAL at 09:44

## 2025-05-08 RX ADMIN — IBUPROFEN 600 MG: 600 TABLET ORAL at 21:08

## 2025-05-08 RX ADMIN — IBUPROFEN 600 MG: 600 TABLET ORAL at 09:44

## 2025-05-08 RX ADMIN — ACETAMINOPHEN 975 MG: 325 TABLET, FILM COATED ORAL at 15:48

## 2025-05-08 RX ADMIN — ACETAMINOPHEN 975 MG: 325 TABLET, FILM COATED ORAL at 21:07

## 2025-05-08 RX ADMIN — IBUPROFEN 600 MG: 600 TABLET ORAL at 15:48

## 2025-05-08 RX ADMIN — ACETAMINOPHEN 975 MG: 325 TABLET, FILM COATED ORAL at 03:30

## 2025-05-08 RX ADMIN — IBUPROFEN 600 MG: 600 TABLET ORAL at 03:30

## 2025-05-08 ASSESSMENT — PAIN SCALES - GENERAL: PAINLEVEL_OUTOF10: 0 - NO PAIN

## 2025-05-08 NOTE — PROGRESS NOTES
Postpartum Progress Note    Assessment/Plan   Laura Alexander is a 19 y.o., , who delivered at 38w6d gestation and is now postpartum day 1.    Assessment & Plan  Liveborn infant by vaginal delivery ([facility])    Asthma    Depression    Pregnancy Problems (from 25 to present)       Problem Noted Diagnosed Resolved    Need for hepatitis B vaccination 3/6/2025 by TRISTAN Tinoco  No    Priority:  Medium       Overview Signed 3/6/2025  7:55 AM by TRISTAN Tinoco   Non immune         Oligohydramnios due to rupture of membranes ([facility]) 2025 by Khadijah Loyd MD MPH  2025 by TRISTAN Rios    Priority:  Medium       38 weeks gestation of pregnancy ([facility]) 3/4/2025 by TRISTAN Tinoco  2025 by TRISTAN Rios    Priority:  Medium       Overview Addendum 2025  5:17 PM by Melissa L Zahorsky, APRN-CNM   Transfer at 29w  Desired provider in labor: [x] CNM  [] Physician   [] Either Acceptable  [x] Blood Products: [x] Yes, accepts [] No, needs counseling  [x] Initial BMI: 17.58   [x] Prenatal Labs: WNL for all done at [facility], additional labs done 3/4 WNL except Hep B non immune and anemic   [x] Cervical Cancer Screening up to date: 3/4/25 WNL  [x] Rh status: O+  [x] Screen for IPV and Substance Use Risk: at new OB 3/4  [x] Genetic Screening (cfDNA):  MT21 WNL, female   [x] First Trimester Anatomy Screen (11-13.6 wks): WNL  [x] Baby ASA (initiated): not prescribed  [x] Pregnancy dated by: 13 w    [x] Anatomy: WNL at [facility]  [x] 1hr GCT at 24-28wks: WNL at 29w  [] Fetal Surveillance (if indicated):  [x] Tdap (27-32 wks, may be given up to 36 wks if initial window missed): declined 3/11 and     [x] Feeding Intentions: breast and bottle  [x] Postpartum Birth control method: pill/patch   [] GBS at 36 - 37 wks: collected 25  [] 39 weeks discussion of IOL vs. Expectant management:  [] Mode of delivery ( anticipated ):                  Hospital course: no complications  Vaginal Birth  Patient is currently breastfeedingThe patient's blood type is O POS. The baby's blood type is A POS. Rhogam is not indicated.    Anticipate discharge on PP day #2    Subjective   Her pain is well controlled with current medications  She is passing flatus  She is ambulating well  She is tolerating a Adult diet Regular  She reports no breast or nursing problems  She denies emotional concerns today   Her plan for contraception is interval transdermal patch    Objective   Allergies:   Patient has no known allergies.         Last Vitals:  Temp Pulse Resp BP MAP Pulse Ox   36.5 °C (97.7 °F) 93 16 118/78 91 96 %     Vitals Min/Max Last 24 Hours:  Temp  Min: 36.3 °C (97.3 °F)  Max: 37 °C (98.6 °F)  Pulse  Min: 73  Max: 97  Resp  Min: 16  Max: 20  BP  Min: 106/68  Max: 129/85  MAP (mmHg)  Min: 82  Max: 100    Intake/Output:     Intake/Output Summary (Last 24 hours) at 5/8/2025 1017  Last data filed at 5/7/2025 2033  Gross per 24 hour   Intake --   Output 1000 ml   Net -1000 ml       Physical Exam:  General: Examination reveals a well developed, well nourished, female, in no acute distress. She is alert and cooperative.  Lungs: normal effort.  Abdomen: soft, gravid, nontender, nondistended, no abnormal masses, no epigastric pain.  Fundus: firm and below umbilicus.  Psychological: awake and alert; oriented to person, place, and time.    Chaperone Present: Declined.    Lab Data:  Labs in chart were reviewed.

## 2025-05-08 NOTE — ANESTHESIA POSTPROCEDURE EVALUATION
Patient: Laura Alexander    Procedure Summary       Date: 25 Room / Location:     Anesthesia Start: 805 Anesthesia Stop: 818    Procedure: Labor Analgesia Diagnosis:     Scheduled Providers:  Responsible Provider: Yoandy Lanza MD    Anesthesia Type: epidural ASA Status: 2            Anesthesia Type: epidural    .Laura Alexander is a 19 y.o., , who had a Vaginal, Spontaneous delivery on 2025 at 38w6d and is now POD1.    She had Neuraxial Anesthesia without immediate complications noted.       Pain well controlled    Vitals:    25 2357   BP: 129/85   Pulse: 95   Resp: 20   Temp: 36.7 °C (98.1 °F)   SpO2: 98%       Neuraxial site assessed. No visible redness or swelling or drainage. Patient able to ambulate and move all extremities without difficulty. Able to void. No complaints of nausea/vomiting. Tolerating PO intake well. No s/sx of PDPH.     Anesthesia will sign off     Ian Barroso MD       Anesthesia Post Evaluation    Patient location during evaluation: floor  Patient participation: complete - patient participated  Level of consciousness: awake and alert  Pain management: adequate  Airway patency: patent  Cardiovascular status: acceptable  Respiratory status: acceptable  Hydration status: acceptable  Postoperative Nausea and Vomiting: none        There were no known notable events for this encounter.

## 2025-05-08 NOTE — CARE PLAN
The clinical goals for the shift include VSS, bleeding and swelling minimal, pain controlled with medications, and adequately feeding infant.    Over the shift, the patient made progress toward the following goals.       Problem: Risk for Suicide  Goal: Accepts medications as prescribed/needed this shift  Outcome: Progressing  Goal: Identifies supports this shift  Outcome: Progressing  Goal: Makes needs known through verbalization or behaviors this shift  Outcome: Progressing  Goal: No self harm this shift  Outcome: Progressing  Goal: Read Safety Guidelines this shift  Outcome: Progressing  Goal: Complete Mental Health Safety Plan (psychiatry only) this shift  Outcome: Progressing     Problem: Vaginal Birth or  Section  Goal: Minimal s/sx of HDP and BP<160/110  Outcome: Progressing     Problem: Postpartum  Goal: No s/sx infection  Outcome: Progressing  Goal: No s/sx of hemorrhage  Outcome: Progressing       Assessment and VS WDL, pain well controlled, patient stable.

## 2025-05-08 NOTE — LACTATION NOTE
LC attempted to see patient again and she was sleeping. Bedside RN reports that mom is latching well with no issues. LC will try again later.

## 2025-05-08 NOTE — PROGRESS NOTES
Social Work Assessment     Patient: Laura Alexander, 18yo,   Address: [address] [place] [address]  Phone: [phone]    Referral Reason: assessment - hx depression with SI    [facility]atal Care: [facility], [facility]  Barriers: reports none    [place] Name: Antoinette Hull    Household Composition: Ms Alexander reports she lives in a stable home with her mother and 12yo brother.    Supports: Ms Alexander reports her mother is her primary support.     IPV/DV or Safety Concerns: Ms Alexander denies IPV/safety concerns at this time.     Car-Seat: yes  Safe Sleep Space: yes  Safe Sleep Education: reviewed    Transportation Concerns: Ms Alexander denies concerns. She states her mother has a vehicle and can generally give her a ride. She states FRANKB also has a vehicle. SW reviewed [employer] transportation.     School/Work/Income: Ms Alexander reports she plans to apply for food stamps when she is 21yo and can be on her own case. She states she has not yet applied for WIC but plans to do so. She is currently exclusively breast milk feeding and denies concerns.     Mental Health Diagnoses/Concerns: Ms Alexander with a history of depression with hospital admissions for SI/Hi in 2020 and . She states she was in counseling for a time afterward and found it helpful (in-person only). She says she would return to counseling if needed. She also states she could talk to her mother if she was experiencing depression symptoms. Ms Alexander denies concern since last SI and denies signs and symptoms of depression and anxiety this pregnancy and admission. SW reviewed postpartum depression signs, symptoms, and resources and  indicated understanding.    Bonding: No bonding concerns noted.     Assessment:  SW met with Ms Alexander for assessment. She was accepting and engaged. She reports she has needed items for  and good support. Safe sleep and postpartum education provided. No concerns noted.     Plan: Ms Alexander and   clear from SW perspective.    Signature: EMMA Deng

## 2025-05-08 NOTE — LACTATION NOTE
This LC did not observe a feed. When LC attempted to see patient she was in the shower. LC asked FOB if he could have mom call out with the next feed so LC can observe. 24 hour weight loss appropriate. Bedside nurse states that mom is doing well with latching and she has no concerns.

## 2025-05-09 ENCOUNTER — PHARMACY VISIT (OUTPATIENT)
Dept: PHARMACY | Facility: CLINIC | Age: 20
End: 2025-05-09
Payer: MEDICAID

## 2025-05-09 VITALS
HEIGHT: 63 IN | OXYGEN SATURATION: 97 % | TEMPERATURE: 98.6 F | RESPIRATION RATE: 16 BRPM | DIASTOLIC BLOOD PRESSURE: 84 MMHG | WEIGHT: 132.5 LBS | HEART RATE: 70 BPM | SYSTOLIC BLOOD PRESSURE: 125 MMHG | BODY MASS INDEX: 23.48 KG/M2

## 2025-05-09 PROCEDURE — 2500000001 HC RX 250 WO HCPCS SELF ADMINISTERED DRUGS (ALT 637 FOR MEDICARE OP): Mod: SE | Performed by: STUDENT IN AN ORGANIZED HEALTH CARE EDUCATION/TRAINING PROGRAM

## 2025-05-09 PROCEDURE — RXMED WILLOW AMBULATORY MEDICATION CHARGE

## 2025-05-09 RX ORDER — DROSPIRENONE 4 MG/1
4 TABLET, FILM COATED ORAL DAILY
Qty: 28 TABLET | Refills: 11 | Status: SHIPPED | OUTPATIENT
Start: 2025-05-09

## 2025-05-09 RX ORDER — IBUPROFEN 600 MG/1
600 TABLET, FILM COATED ORAL EVERY 6 HOURS PRN
Qty: 90 TABLET | Refills: 0 | Status: SHIPPED | OUTPATIENT
Start: 2025-05-09

## 2025-05-09 RX ORDER — ACETAMINOPHEN 325 MG/1
650 TABLET ORAL EVERY 6 HOURS PRN
Qty: 90 TABLET | Refills: 0 | Status: SHIPPED | OUTPATIENT
Start: 2025-05-09

## 2025-05-09 RX ADMIN — ACETAMINOPHEN 975 MG: 325 TABLET, FILM COATED ORAL at 10:50

## 2025-05-09 RX ADMIN — FERROUS GLUCONATE 486 MG: 324 TABLET ORAL at 10:50

## 2025-05-09 RX ADMIN — ACETAMINOPHEN 975 MG: 325 TABLET, FILM COATED ORAL at 04:02

## 2025-05-09 RX ADMIN — IBUPROFEN 600 MG: 600 TABLET ORAL at 10:50

## 2025-05-09 RX ADMIN — IBUPROFEN 600 MG: 600 TABLET ORAL at 04:02

## 2025-05-09 ASSESSMENT — PAIN SCALES - GENERAL
PAINLEVEL_OUTOF10: 0 - NO PAIN
PAINLEVEL_OUTOF10: 0 - NO PAIN

## 2025-05-09 NOTE — DISCHARGE SUMMARY
Discharge Summary    Admission Date: 5/6/2025  Discharge Date: 5/9/25    Discharge Diagnosis  Liveborn infant by vaginal delivery ([facility])    Hospital Course  Delivery Date: 5/7/2025 8:18 AM  Delivery type: Vaginal, Spontaneous   GA at delivery: 38w6d  Outcome: Living  Anesthesia during delivery: None  Intrapartum complications: None  Feeding method: Breastfeeding Status: Yes     Procedures: none  Contraception at discharge: oral contraceptives    Patient doing well. Bleeding appropriate. Pain well controlled overall. Patient denies any problems urinating or passing gas. Has already had BM. She has been up walking around without issue. Breastfeeding going well. Patient sleeping ok. Moodwise doing ok.     Pertinent Physical Exam At Time of Discharge  General: Examination reveals a well developed, well nourished, female, in no acute distress. She is alert and cooperative  HEENT: External ears normal. Nose normal, no erythema or discharge  Lungs: breathing even and unlabored   Cardiac: warm and well perfused   Breasts: nipples intact  Fundus: firm, non-tender, and below umbilicus, lochia light  Extremities: no redness or tenderness in the calves or thighs, no edema  Neurological: alert, oriented, normal speech, no focal findings or movement disorder noted  Psychological: awake and alert; oriented to person, place, and time      Last Vitals:  Temp Pulse Resp BP MAP Pulse Ox   37 °C (98.6 °F) 70 16 125/84 98 97 %     Discharge Meds     Your medication list        START taking these medications        Instructions Last Dose Given Next Dose Due   acetaminophen 325 mg tablet  Commonly known as: Tylenol      Take 2 tablets (650 mg) by mouth every 6 hours if needed for mild pain (1 - 3) or moderate pain (4 - 6).       ibuprofen 600 mg tablet      Take 1 tablet (600 mg) by mouth every 6 hours if needed for mild pain (1 - 3) or moderate pain (4 - 6).       Slynd 4 mg (28) tablet  Generic drug: drospirenone (contraceptive)       Take 1 tablet by mouth once daily.              CONTINUE taking these medications        Instructions Last Dose Given Next Dose Due   Prenatal 28 mg iron- 800 mcg tablet  Generic drug: prenatal vitamin (iron-folic)      Take 1 tablet by mouth once daily.              STOP taking these medications      docusate sodium 100 mg capsule  Commonly known as: Colace        Ferate 240 mg (27 mg iron) tablet  Generic drug: ferrous gluconate                  Where to Get Your Medications        These medications were sent to [facility]  [address] [place] [address]      Hours: 8:30 AM to 5 PM Mon-Fri Phone: [phone]   acetaminophen 325 mg tablet  ibuprofen 600 mg tablet  Slynd 4 mg (28) tablet          Complications Requiring Follow-Up  none    Test Results Pending At Discharge  Pending Labs       No current pending labs.            Outpatient Follow-Up  4-6 week PP visit     I spent 20 minutes in the professional and overall care of this patient.      TRISATN Tinoco

## 2025-05-09 NOTE — CARE PLAN
Problem: Risk for Suicide  Goal: Accepts medications as prescribed/needed this shift  Outcome: Adequate for Discharge  Goal: Identifies supports this shift  Outcome: Adequate for Discharge  Goal: Makes needs known through verbalization or behaviors this shift  Outcome: Adequate for Discharge  Goal: No self harm this shift  Outcome: Adequate for Discharge  Goal: Read Safety Guidelines this shift  Outcome: Adequate for Discharge  Goal: Complete Mental Health Safety Plan (psychiatry only) this shift  Outcome: Adequate for Discharge  Problem: Vaginal Birth or  Section  Goal: Minimal s/sx of HDP and BP<160/110  Outcome: Adequate for Discharge  Problem: Postpartum  Goal: No s/sx infection  Outcome: Adequate for Discharge  Goal: No s/sx of hemorrhage  Outcome: Adequate for Discharge     The patient's goals for the shift include VSS, bleeding and swelling minimal, pain controlled with medications, and adequately feeding infant.     The clinical goals for the shift include VSS, bleeding and swelling minimal, pain controlled with medications, and adequately feeding infant.    Over the shift the patient made progress to goals above. Patient maintained VS WNL, bleeding and swelling minimal, and pain controlled with medications. Patient is motivated to continue learning about breastfeeding and latching infant.

## 2025-05-20 ENCOUNTER — APPOINTMENT (OUTPATIENT)
Dept: OBSTETRICS AND GYNECOLOGY | Facility: CLINIC | Age: 20
End: 2025-05-20
Payer: COMMERCIAL

## 2025-06-03 ENCOUNTER — APPOINTMENT (OUTPATIENT)
Dept: OBSTETRICS AND GYNECOLOGY | Facility: CLINIC | Age: 20
End: 2025-06-03
Payer: COMMERCIAL

## 2025-06-13 ENCOUNTER — APPOINTMENT (OUTPATIENT)
Dept: OBSTETRICS AND GYNECOLOGY | Facility: CLINIC | Age: 20
End: 2025-06-13
Payer: COMMERCIAL

## 2025-06-13 VITALS
WEIGHT: 103.6 LBS | SYSTOLIC BLOOD PRESSURE: 99 MMHG | HEIGHT: 63 IN | BODY MASS INDEX: 18.36 KG/M2 | DIASTOLIC BLOOD PRESSURE: 65 MMHG | HEART RATE: 85 BPM

## 2025-06-13 DIAGNOSIS — N93.9 ABNORMAL UTERINE BLEEDING (AUB): ICD-10-CM

## 2025-06-13 DIAGNOSIS — Z11.3 SCREENING EXAMINATION FOR STI: ICD-10-CM

## 2025-06-13 DIAGNOSIS — Z30.430 ENCOUNTER FOR INSERTION OF COPPER IUD: ICD-10-CM

## 2025-06-13 DIAGNOSIS — Z78.9 NONIMMUNE TO HEPATITIS B VIRUS: ICD-10-CM

## 2025-06-13 LAB — PREGNANCY TEST URINE, POC: NEGATIVE

## 2025-06-13 PROCEDURE — 2500000004 HC RX 250 GENERAL PHARMACY W/ HCPCS (ALT 636 FOR OP/ED): Mod: SE | Performed by: STUDENT IN AN ORGANIZED HEALTH CARE EDUCATION/TRAINING PROGRAM

## 2025-06-13 PROCEDURE — 58300 INSERT INTRAUTERINE DEVICE: CPT | Mod: 25 | Performed by: STUDENT IN AN ORGANIZED HEALTH CARE EDUCATION/TRAINING PROGRAM

## 2025-06-13 PROCEDURE — 99214 OFFICE O/P EST MOD 30 MIN: CPT | Performed by: STUDENT IN AN ORGANIZED HEALTH CARE EDUCATION/TRAINING PROGRAM

## 2025-06-13 PROCEDURE — 81025 URINE PREGNANCY TEST: CPT | Performed by: STUDENT IN AN ORGANIZED HEALTH CARE EDUCATION/TRAINING PROGRAM

## 2025-06-13 RX ADMIN — COPPER: 313.4 INTRAUTERINE DEVICE INTRAUTERINE at 09:45

## 2025-06-13 ASSESSMENT — EDINBURGH POSTNATAL DEPRESSION SCALE (EPDS)
I HAVE BEEN ANXIOUS OR WORRIED FOR NO GOOD REASON: NO, NOT AT ALL
I HAVE FELT SAD OR MISERABLE: NO, NOT AT ALL
I HAVE BLAMED MYSELF UNNECESSARILY WHEN THINGS WENT WRONG: NO, NEVER
I HAVE BEEN SO UNHAPPY THAT I HAVE BEEN CRYING: NO, NEVER
I HAVE BEEN ABLE TO LAUGH AND SEE THE FUNNY SIDE OF THINGS: AS MUCH AS I ALWAYS COULD
I HAVE FELT SCARED OR PANICKY FOR NO GOOD REASON: NO, NOT AT ALL
TOTAL SCORE: 0
THINGS HAVE BEEN GETTING ON TOP OF ME: NO, I HAVE BEEN COPING AS WELL AS EVER
I HAVE BEEN SO UNHAPPY THAT I HAVE HAD DIFFICULTY SLEEPING: NOT AT ALL
I HAVE LOOKED FORWARD WITH ENJOYMENT TO THINGS: AS MUCH AS I EVER DID
THE THOUGHT OF HARMING MYSELF HAS OCCURRED TO ME: NEVER

## 2025-06-13 ASSESSMENT — ENCOUNTER SYMPTOMS
DEPRESSION: 0
ENDOCRINE NEGATIVE: 0
NEUROLOGICAL NEGATIVE: 0
RESPIRATORY NEGATIVE: 0
CARDIOVASCULAR NEGATIVE: 0
CONSTITUTIONAL NEGATIVE: 0
EYES NEGATIVE: 0
ALLERGIC/IMMUNOLOGIC NEGATIVE: 0
OCCASIONAL FEELINGS OF UNSTEADINESS: 0
MUSCULOSKELETAL NEGATIVE: 0
PSYCHIATRIC NEGATIVE: 0
HEMATOLOGIC/LYMPHATIC NEGATIVE: 0
GASTROINTESTINAL NEGATIVE: 0
LOSS OF SENSATION IN FEET: 0

## 2025-06-13 ASSESSMENT — PAIN SCALES - GENERAL: PAINLEVEL_OUTOF10: 0-NO PAIN

## 2025-06-13 ASSESSMENT — COLUMBIA-SUICIDE SEVERITY RATING SCALE - C-SSRS
6. HAVE YOU EVER DONE ANYTHING, STARTED TO DO ANYTHING, OR PREPARED TO DO ANYTHING TO END YOUR LIFE?: NO
6. HAVE YOU EVER DONE ANYTHING, STARTED TO DO ANYTHING, OR PREPARED TO DO ANYTHING TO END YOUR LIFE?: NO
1. IN THE PAST MONTH, HAVE YOU WISHED YOU WERE DEAD OR WISHED YOU COULD GO TO SLEEP AND NOT WAKE UP?: NO
2. HAVE YOU ACTUALLY HAD ANY THOUGHTS OF KILLING YOURSELF?: NO

## 2025-06-13 ASSESSMENT — PATIENT HEALTH QUESTIONNAIRE - PHQ9
2. FEELING DOWN, DEPRESSED OR HOPELESS: NOT AT ALL
SUM OF ALL RESPONSES TO PHQ9 QUESTIONS 1 AND 2: 0
1. LITTLE INTEREST OR PLEASURE IN DOING THINGS: NOT AT ALL

## 2025-06-13 NOTE — PROGRESS NOTES
Subjective     20 y.o.  presenting for postpartum follow-up     Delivery Date: 2025  GA at Delivery: 38w6d  Type of Delivery: Vaginal, Spontaneous     Pregnancy Problems (from 25 to present)       Problem Noted Diagnosed Resolved    Need for hepatitis B vaccination 3/6/2025 by TRISTAN Tinoco  No    Overview Signed 3/6/2025  7:55 AM by TRISTAN Tinoco   Non immune         Oligohydramnios due to rupture of membranes ([facility]) 2025 by Khadijah Loyd MD MPH  2025 by TRISTAN Rios    38 weeks gestation of pregnancy ([facility]) 3/4/2025 by TRISTAN Tinoco  2025 by TRISTAN Rios    Overview Addendum 2025  5:17 PM by Melissa L Zahorsky, APRN-CNM   Transfer at 29w  Desired provider in labor: [x] CNM  [] Physician   [] Either Acceptable  [x] Blood Products: [x] Yes, accepts [] No, needs counseling  [x] Initial BMI: 17.58   [x] Prenatal Labs: WNL for all done at [facility], additional labs done 3/4 WNL except Hep B non immune and anemic   [x] Cervical Cancer Screening up to date: 3/4/25 WNL  [x] Rh status: O+  [x] Screen for IPV and Substance Use Risk: at new OB 3/4  [x] Genetic Screening (cfDNA):  MT21 WNL, female   [x] First Trimester Anatomy Screen (11-13.6 wks): WNL  [x] Baby ASA (initiated): not prescribed  [x] Pregnancy dated by: 13 w    [x] Anatomy: WNL at [facility]  [x] 1hr GCT at 24-28wks: WNL at 29w  [] Fetal Surveillance (if indicated):  [x] Tdap (27-32 wks, may be given up to 36 wks if initial window missed): declined 3/11 and     [x] Feeding Intentions: breast and bottle  [x] Postpartum Birth control method: pill/patch   [] GBS at 36 - 37 wks: collected 25  [] 39 weeks discussion of IOL vs. Expectant management:  [] Mode of delivery ( anticipated ):                   Concerns: Has resumed intercourse, irregular bleeding, concerned she may be pregnant. Desires IUD today.    Pain: controlled  Lacerations:  none  Menses: No- not yet resolved post partum, still spotting  Sexual Intimacy: Yes  Contraceptive Method: none  Feeding Method: She is breast feeding exclusively. no breast or nursing problems  Lactation Consult Needed?: No  Birth Trauma: No  Bonding with Baby: well with baby girl Antoinette  Mood: EPDS 0 doing well       Objective   Vitals:    06/13/25 0945   BP: 99/65   Pulse: 85     Physical Exam    IUD Management    Performed by: Shaun Hwang MD  Authorized by: Shaun Hwang MD    Procedure: IUD insertion    Consent obtained by patient, parent, or legal power of  - including discussion of procedure risks and benefits, patient questions answered, and patient education provided: yes    Pregnancy risk: reasonably certain the patient is not pregnant    Date/Time of Insertion:  6/13/2025 10:03 AM  Immediately prior to procedure a time out was called: yes    Pelvic exam performed: yes    Speculum placed in vagina: yes    Cervix cleaned and prepped: yes    Tenaculum/Allis/Ring Forceps applied to cervix: yes    Anesthesia used: no    Uterus sound depth (cm):  7  Cervix manually dilated: no    IUD inserted without complications: yes    OSM: copper 380 square mm  Strings trimmed to (cm):  3  Patient tolerated procedure well: yes    Inserted with ultrasound guidance: no    Transvaginal sono confirmed fundal placement: no    Intended removal date: 10 years           Assessment/Plan   Problem List Items Addressed This Visit       Nonimmune to hepatitis B virus    Overview   Non immune  Record reviewed - received Hep B vaccine in childhood- no need for vaccination         Liveborn infant by vaginal delivery ([facility]) - Primary    Current Assessment & Plan   Meeting milestones  Reviewed contraceptive options including risks, benefits, side effects and contra-indications of POP, cOCP, patch, ring, depo, LARCs  Patient desires paragard  Annual exam in 3-6 months. Pap UTD. Gardasil UTD.         Encounter  for insertion of copper IUD    Current Assessment & Plan   Contraception management  - Discussion held today regarding reproductive life planning and family planning, as well as optimization of health for future pregnancies. Autonomy of patient respected and confidentiality discussed. All currently available methods of contraception discussed, including temporary and permanent methods. Indications, risks, benefits, bleeding patterns, expectations, usage instructions, and efficacy of each reviewed. Informed patient that no hormonal methods of contraception prevent acquisition or transmission of STDs, and that condoms should be used for that purpose if it is relevant to her exposure risk. CDC guidelines for STD testing should be followed for routine testing, in addition to patient driven testing based on concerns, symptoms, and exposures. Ease of testing reviewed, and encouraged self-awareness of patient's own risks.   - Reproductive life-planning: Folic acid supplementation preconception, pregnancy spacing, medical condition optimization, medication and immunization review prior to any planned pregnancies, and preconception consult for certain high risk medical conditions all encouraged.  - Based on her current history, and based on CDC [facility] guidelines, she is a candidate for all available methods except:  none  - Patient chooses to use:  Paragard  - Reviewed instructions for initiation and continuation per current CDC SPR guidelines.  - Need for backup contraception reviewed based on current [facility] and SPR guidelines. Questions answered. Call parameters reviewed.          Relevant Orders    IUD Management     Other Visit Diagnoses         Screening examination for STI        Relevant Orders    Hepatitis B Surface Antigen    Hepatitis C Antibody    HIV 1/2 Antigen/Antibody Screen with Reflex to Confirmation    Syphilis Screen with Reflex    Trichomonas vaginalis, Amplified    C. trachomatis / N. gonorrhoeae,  Amplified, Urogenital      Abnormal uterine bleeding (AUB)        Relevant Orders    POCT pregnancy, urine manually resulted (Completed)             Shaun Hwang MD

## 2025-06-13 NOTE — ASSESSMENT & PLAN NOTE
Meeting milestones  Reviewed contraceptive options including risks, benefits, side effects and contra-indications of POP, cOCP, patch, ring, depo, LARCs  Patient desires paragard  Annual exam in 3-6 months. Pap UTD. Gardasil UTD.

## 2025-06-13 NOTE — ASSESSMENT & PLAN NOTE
Contraception management  - Discussion held today regarding reproductive life planning and family planning, as well as optimization of health for future pregnancies. Autonomy of patient respected and confidentiality discussed. All currently available methods of contraception discussed, including temporary and permanent methods. Indications, risks, benefits, bleeding patterns, expectations, usage instructions, and efficacy of each reviewed. Informed patient that no hormonal methods of contraception prevent acquisition or transmission of STDs, and that condoms should be used for that purpose if it is relevant to her exposure risk. CDC guidelines for STD testing should be followed for routine testing, in addition to patient driven testing based on concerns, symptoms, and exposures. Ease of testing reviewed, and encouraged self-awareness of patient's own risks.   - Reproductive life-planning: Folic acid supplementation preconception, pregnancy spacing, medical condition optimization, medication and immunization review prior to any planned pregnancies, and preconception consult for certain high risk medical conditions all encouraged.  - Based on her current history, and based on CDC [facility] guidelines, she is a candidate for all available methods except:  none  - Patient chooses to use:  Paragard  - Reviewed instructions for initiation and continuation per current [facility] SPR guidelines.  - Need for backup contraception reviewed based on current CDC [facility] and SPR guidelines. Questions answered. Call parameters reviewed.

## 2025-06-14 LAB
C TRACH RRNA SPEC QL NAA+PROBE: NOT DETECTED
N GONORRHOEA RRNA SPEC QL NAA+PROBE: NOT DETECTED
QUEST GC CT AMPLIFIED (ALWAYS MESSAGE): NORMAL
T VAGINALIS RRNA SPEC QL NAA+PROBE: NOT DETECTED